# Patient Record
Sex: MALE | Race: WHITE | NOT HISPANIC OR LATINO | Employment: OTHER | URBAN - METROPOLITAN AREA
[De-identification: names, ages, dates, MRNs, and addresses within clinical notes are randomized per-mention and may not be internally consistent; named-entity substitution may affect disease eponyms.]

---

## 2018-01-25 ENCOUNTER — APPOINTMENT (OUTPATIENT)
Dept: LAB | Facility: CLINIC | Age: 83
End: 2018-01-25
Payer: MEDICARE

## 2018-01-25 ENCOUNTER — TRANSCRIBE ORDERS (OUTPATIENT)
Dept: LAB | Facility: CLINIC | Age: 83
End: 2018-01-25

## 2018-01-25 DIAGNOSIS — E03.9 MYXEDEMA HEART DISEASE: ICD-10-CM

## 2018-01-25 DIAGNOSIS — E55.9 VITAMIN D DEFICIENCY: ICD-10-CM

## 2018-01-25 DIAGNOSIS — I51.9 MYXEDEMA HEART DISEASE: ICD-10-CM

## 2018-01-25 DIAGNOSIS — N39.0 URINARY TRACT INFECTION WITHOUT HEMATURIA, SITE UNSPECIFIED: ICD-10-CM

## 2018-01-25 DIAGNOSIS — E11.69 TYPE 2 DIABETES MELLITUS WITH OTHER SPECIFIED COMPLICATION, WITHOUT LONG-TERM CURRENT USE OF INSULIN (HCC): Primary | ICD-10-CM

## 2018-01-25 DIAGNOSIS — R97.20 ELEVATED PROSTATE SPECIFIC ANTIGEN (PSA): ICD-10-CM

## 2018-01-25 DIAGNOSIS — D51.0 PERNICIOUS ANEMIA: ICD-10-CM

## 2018-01-25 DIAGNOSIS — R53.83 FATIGUE, UNSPECIFIED TYPE: ICD-10-CM

## 2018-01-25 DIAGNOSIS — E78.5 HYPERLIPIDEMIA, UNSPECIFIED HYPERLIPIDEMIA TYPE: ICD-10-CM

## 2018-01-25 LAB
25(OH)D3 SERPL-MCNC: 33.7 NG/ML (ref 30–100)
ALBUMIN SERPL BCP-MCNC: 3.7 G/DL (ref 3.5–5)
ALP SERPL-CCNC: 57 U/L (ref 46–116)
ALT SERPL W P-5'-P-CCNC: 22 U/L (ref 12–78)
ANION GAP SERPL CALCULATED.3IONS-SCNC: 7 MMOL/L (ref 4–13)
AST SERPL W P-5'-P-CCNC: 13 U/L (ref 5–45)
BACTERIA UR QL AUTO: NORMAL /HPF
BILIRUB SERPL-MCNC: 0.63 MG/DL (ref 0.2–1)
BILIRUB UR QL STRIP: NEGATIVE
BUN SERPL-MCNC: 37 MG/DL (ref 5–25)
CALCIUM SERPL-MCNC: 9.2 MG/DL (ref 8.3–10.1)
CHLORIDE SERPL-SCNC: 103 MMOL/L (ref 100–108)
CHOLEST SERPL-MCNC: 181 MG/DL (ref 50–200)
CLARITY UR: CLEAR
CO2 SERPL-SCNC: 27 MMOL/L (ref 21–32)
COLOR UR: YELLOW
CREAT SERPL-MCNC: 1.57 MG/DL (ref 0.6–1.3)
CREAT UR-MCNC: 74.7 MG/DL
ERYTHROCYTE [DISTWIDTH] IN BLOOD BY AUTOMATED COUNT: 14.9 % (ref 11.6–15.1)
EST. AVERAGE GLUCOSE BLD GHB EST-MCNC: 163 MG/DL
GFR SERPL CREATININE-BSD FRML MDRD: 38 ML/MIN/1.73SQ M
GLUCOSE P FAST SERPL-MCNC: 131 MG/DL (ref 65–99)
GLUCOSE UR STRIP-MCNC: ABNORMAL MG/DL
HBA1C MFR BLD: 7.3 % (ref 4.2–6.3)
HCT VFR BLD AUTO: 37.4 % (ref 36.5–49.3)
HDLC SERPL-MCNC: 67 MG/DL (ref 40–60)
HGB BLD-MCNC: 12.7 G/DL (ref 12–17)
HGB UR QL STRIP.AUTO: NEGATIVE
HYALINE CASTS #/AREA URNS LPF: NORMAL /LPF
KETONES UR STRIP-MCNC: NEGATIVE MG/DL
LDLC SERPL CALC-MCNC: 72 MG/DL (ref 0–100)
LEUKOCYTE ESTERASE UR QL STRIP: ABNORMAL
MCH RBC QN AUTO: 32.5 PG (ref 26.8–34.3)
MCHC RBC AUTO-ENTMCNC: 34 G/DL (ref 31.4–37.4)
MCV RBC AUTO: 96 FL (ref 82–98)
MICROALBUMIN UR-MCNC: <5 MG/L (ref 0–20)
MICROALBUMIN/CREAT 24H UR: <7 MG/G CREATININE (ref 0–30)
NITRITE UR QL STRIP: NEGATIVE
NON-SQ EPI CELLS URNS QL MICRO: NORMAL /HPF
PH UR STRIP.AUTO: 6 [PH] (ref 4.5–8)
PLATELET # BLD AUTO: 179 THOUSANDS/UL (ref 149–390)
PMV BLD AUTO: 10.1 FL (ref 8.9–12.7)
POTASSIUM SERPL-SCNC: 3.9 MMOL/L (ref 3.5–5.3)
PROT SERPL-MCNC: 7.1 G/DL (ref 6.4–8.2)
PROT UR STRIP-MCNC: NEGATIVE MG/DL
PSA SERPL-MCNC: 3 NG/ML (ref 0–4)
RBC # BLD AUTO: 3.91 MILLION/UL (ref 3.88–5.62)
RBC #/AREA URNS AUTO: NORMAL /HPF
SODIUM SERPL-SCNC: 137 MMOL/L (ref 136–145)
SP GR UR STRIP.AUTO: 1.02 (ref 1–1.03)
T4 SERPL-MCNC: 9.6 UG/DL (ref 4.7–13.3)
TRIGL SERPL-MCNC: 208 MG/DL
TSH SERPL DL<=0.05 MIU/L-ACNC: 4.46 UIU/ML (ref 0.36–3.74)
UROBILINOGEN UR QL STRIP.AUTO: 0.2 E.U./DL
VIT B12 SERPL-MCNC: 252 PG/ML (ref 100–900)
WBC # BLD AUTO: 4.94 THOUSAND/UL (ref 4.31–10.16)
WBC #/AREA URNS AUTO: NORMAL /HPF

## 2018-01-25 PROCEDURE — 83036 HEMOGLOBIN GLYCOSYLATED A1C: CPT

## 2018-01-25 PROCEDURE — 84436 ASSAY OF TOTAL THYROXINE: CPT

## 2018-01-25 PROCEDURE — 80061 LIPID PANEL: CPT

## 2018-01-25 PROCEDURE — 36415 COLL VENOUS BLD VENIPUNCTURE: CPT

## 2018-01-25 PROCEDURE — 82570 ASSAY OF URINE CREATININE: CPT

## 2018-01-25 PROCEDURE — 82607 VITAMIN B-12: CPT

## 2018-01-25 PROCEDURE — 84443 ASSAY THYROID STIM HORMONE: CPT

## 2018-01-25 PROCEDURE — 84153 ASSAY OF PSA TOTAL: CPT

## 2018-01-25 PROCEDURE — 80053 COMPREHEN METABOLIC PANEL: CPT

## 2018-01-25 PROCEDURE — 82043 UR ALBUMIN QUANTITATIVE: CPT

## 2018-01-25 PROCEDURE — 81001 URINALYSIS AUTO W/SCOPE: CPT

## 2018-01-25 PROCEDURE — 82306 VITAMIN D 25 HYDROXY: CPT

## 2018-01-25 PROCEDURE — 85027 COMPLETE CBC AUTOMATED: CPT

## 2018-10-09 ENCOUNTER — APPOINTMENT (OUTPATIENT)
Dept: LAB | Facility: CLINIC | Age: 83
End: 2018-10-09
Payer: MEDICARE

## 2018-10-09 ENCOUNTER — TRANSCRIBE ORDERS (OUTPATIENT)
Dept: LAB | Facility: CLINIC | Age: 83
End: 2018-10-09

## 2018-10-09 DIAGNOSIS — I10 HYPERTENSION, UNSPECIFIED TYPE: Primary | ICD-10-CM

## 2018-10-09 DIAGNOSIS — E13.8 DIABETES MELLITUS OF OTHER TYPE WITH COMPLICATION, UNSPECIFIED WHETHER LONG TERM INSULIN USE: ICD-10-CM

## 2018-10-09 DIAGNOSIS — E78.00 PURE HYPERCHOLESTEROLEMIA: ICD-10-CM

## 2018-10-09 DIAGNOSIS — I10 HYPERTENSION, UNSPECIFIED TYPE: ICD-10-CM

## 2018-10-09 LAB
25(OH)D3 SERPL-MCNC: 53.3 NG/ML (ref 30–100)
ALBUMIN SERPL BCP-MCNC: 3.6 G/DL (ref 3.5–5)
ALP SERPL-CCNC: 54 U/L (ref 46–116)
ALT SERPL W P-5'-P-CCNC: 20 U/L (ref 12–78)
ANION GAP SERPL CALCULATED.3IONS-SCNC: 8 MMOL/L (ref 4–13)
AST SERPL W P-5'-P-CCNC: 16 U/L (ref 5–45)
BACTERIA UR QL AUTO: ABNORMAL /HPF
BASOPHILS # BLD AUTO: 0.02 THOUSANDS/ΜL (ref 0–0.1)
BASOPHILS NFR BLD AUTO: 0 % (ref 0–1)
BILIRUB SERPL-MCNC: 0.52 MG/DL (ref 0.2–1)
BILIRUB UR QL STRIP: NEGATIVE
BUN SERPL-MCNC: 33 MG/DL (ref 5–25)
CALCIUM SERPL-MCNC: 9.6 MG/DL (ref 8.3–10.1)
CHLORIDE SERPL-SCNC: 104 MMOL/L (ref 100–108)
CHOLEST SERPL-MCNC: 155 MG/DL (ref 50–200)
CLARITY UR: CLEAR
CO2 SERPL-SCNC: 26 MMOL/L (ref 21–32)
COLOR UR: YELLOW
CREAT SERPL-MCNC: 1.59 MG/DL (ref 0.6–1.3)
EOSINOPHIL # BLD AUTO: 0.05 THOUSAND/ΜL (ref 0–0.61)
EOSINOPHIL NFR BLD AUTO: 1 % (ref 0–6)
ERYTHROCYTE [DISTWIDTH] IN BLOOD BY AUTOMATED COUNT: 14.6 % (ref 11.6–15.1)
FOLATE SERPL-MCNC: 19.3 NG/ML (ref 3.1–17.5)
GFR SERPL CREATININE-BSD FRML MDRD: 37 ML/MIN/1.73SQ M
GLUCOSE P FAST SERPL-MCNC: 97 MG/DL (ref 65–99)
GLUCOSE UR STRIP-MCNC: ABNORMAL MG/DL
HCT VFR BLD AUTO: 33.9 % (ref 36.5–49.3)
HDLC SERPL-MCNC: 55 MG/DL (ref 40–60)
HGB BLD-MCNC: 11.2 G/DL (ref 12–17)
HGB UR QL STRIP.AUTO: NEGATIVE
HYALINE CASTS #/AREA URNS LPF: ABNORMAL /LPF
IMM GRANULOCYTES # BLD AUTO: 0.01 THOUSAND/UL (ref 0–0.2)
IMM GRANULOCYTES NFR BLD AUTO: 0 % (ref 0–2)
KETONES UR STRIP-MCNC: NEGATIVE MG/DL
LDLC SERPL CALC-MCNC: 65 MG/DL (ref 0–100)
LEUKOCYTE ESTERASE UR QL STRIP: ABNORMAL
LYMPHOCYTES # BLD AUTO: 1.39 THOUSANDS/ΜL (ref 0.6–4.47)
LYMPHOCYTES NFR BLD AUTO: 29 % (ref 14–44)
MAGNESIUM SERPL-MCNC: 1.8 MG/DL (ref 1.6–2.6)
MCH RBC QN AUTO: 32.2 PG (ref 26.8–34.3)
MCHC RBC AUTO-ENTMCNC: 33 G/DL (ref 31.4–37.4)
MCV RBC AUTO: 97 FL (ref 82–98)
MONOCYTES # BLD AUTO: 0.52 THOUSAND/ΜL (ref 0.17–1.22)
MONOCYTES NFR BLD AUTO: 11 % (ref 4–12)
NEUTROPHILS # BLD AUTO: 2.76 THOUSANDS/ΜL (ref 1.85–7.62)
NEUTS SEG NFR BLD AUTO: 59 % (ref 43–75)
NITRITE UR QL STRIP: NEGATIVE
NON-SQ EPI CELLS URNS QL MICRO: ABNORMAL /HPF
NONHDLC SERPL-MCNC: 100 MG/DL
NRBC BLD AUTO-RTO: 0 /100 WBCS
PH UR STRIP.AUTO: 6 [PH] (ref 4.5–8)
PLATELET # BLD AUTO: 154 THOUSANDS/UL (ref 149–390)
PMV BLD AUTO: 10.6 FL (ref 8.9–12.7)
POTASSIUM SERPL-SCNC: 3.9 MMOL/L (ref 3.5–5.3)
PROT SERPL-MCNC: 6.7 G/DL (ref 6.4–8.2)
PROT UR STRIP-MCNC: NEGATIVE MG/DL
PSA SERPL-MCNC: 3.2 NG/ML (ref 0–4)
RBC # BLD AUTO: 3.48 MILLION/UL (ref 3.88–5.62)
RBC #/AREA URNS AUTO: ABNORMAL /HPF
SODIUM SERPL-SCNC: 138 MMOL/L (ref 136–145)
SP GR UR STRIP.AUTO: 1.02 (ref 1–1.03)
T4 FREE SERPL-MCNC: 0.98 NG/DL (ref 0.76–1.46)
TRIGL SERPL-MCNC: 175 MG/DL
TSH SERPL DL<=0.05 MIU/L-ACNC: 2.68 UIU/ML (ref 0.36–3.74)
UROBILINOGEN UR QL STRIP.AUTO: 0.2 E.U./DL
VIT B12 SERPL-MCNC: 223 PG/ML (ref 100–900)
WBC # BLD AUTO: 4.75 THOUSAND/UL (ref 4.31–10.16)
WBC #/AREA URNS AUTO: ABNORMAL /HPF

## 2018-10-09 PROCEDURE — 82746 ASSAY OF FOLIC ACID SERUM: CPT

## 2018-10-09 PROCEDURE — 84443 ASSAY THYROID STIM HORMONE: CPT

## 2018-10-09 PROCEDURE — 82607 VITAMIN B-12: CPT

## 2018-10-09 PROCEDURE — 83735 ASSAY OF MAGNESIUM: CPT

## 2018-10-09 PROCEDURE — 82306 VITAMIN D 25 HYDROXY: CPT

## 2018-10-09 PROCEDURE — 81001 URINALYSIS AUTO W/SCOPE: CPT

## 2018-10-09 PROCEDURE — 86618 LYME DISEASE ANTIBODY: CPT

## 2018-10-09 PROCEDURE — 85025 COMPLETE CBC W/AUTO DIFF WBC: CPT

## 2018-10-09 PROCEDURE — 84207 ASSAY OF VITAMIN B-6: CPT

## 2018-10-09 PROCEDURE — G0103 PSA SCREENING: HCPCS

## 2018-10-09 PROCEDURE — 36415 COLL VENOUS BLD VENIPUNCTURE: CPT

## 2018-10-09 PROCEDURE — 84439 ASSAY OF FREE THYROXINE: CPT

## 2018-10-09 PROCEDURE — 80053 COMPREHEN METABOLIC PANEL: CPT

## 2018-10-09 PROCEDURE — 80061 LIPID PANEL: CPT

## 2018-10-09 PROCEDURE — 84425 ASSAY OF VITAMIN B-1: CPT

## 2018-10-11 LAB
B BURGDOR IGG SER IA-ACNC: 0.23
B BURGDOR IGM SER IA-ACNC: 0.12

## 2018-10-12 LAB — VIT B6 SERPL-MCNC: 7.3 UG/L (ref 5.3–46.7)

## 2018-10-13 LAB — VIT B1 BLD-SCNC: 68.6 NMOL/L (ref 66.5–200)

## 2019-01-14 ENCOUNTER — APPOINTMENT (EMERGENCY)
Dept: CT IMAGING | Facility: HOSPITAL | Age: 84
End: 2019-01-14
Payer: MEDICARE

## 2019-01-14 ENCOUNTER — HOSPITAL ENCOUNTER (EMERGENCY)
Facility: HOSPITAL | Age: 84
Discharge: HOME | End: 2019-01-14
Attending: EMERGENCY MEDICINE | Admitting: EMERGENCY MEDICINE
Payer: MEDICARE

## 2019-01-14 VITALS
OXYGEN SATURATION: 100 % | WEIGHT: 134.44 LBS | HEIGHT: 66 IN | TEMPERATURE: 97.8 F | RESPIRATION RATE: 16 BRPM | SYSTOLIC BLOOD PRESSURE: 127 MMHG | HEART RATE: 81 BPM | DIASTOLIC BLOOD PRESSURE: 64 MMHG | BODY MASS INDEX: 21.61 KG/M2
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
## 2019-01-14 DIAGNOSIS — S09.90XA MINOR HEAD INJURY: ICD-10-CM

## 2019-01-14 DIAGNOSIS — S01.01XA SCALP LACERATION: Primary | ICD-10-CM

## 2019-01-14 DIAGNOSIS — S51.012A SKIN TEAR OF LEFT ELBOW WITHOUT COMPLICATION: ICD-10-CM

## 2019-01-14 LAB
ANION GAP SERPL CALCULATED.3IONS-SCNC: 9 MMOL/L (ref 4–13)
BASOPHILS # BLD AUTO: 0.02 THOUSANDS/ΜL (ref 0–0.1)
BASOPHILS NFR BLD AUTO: 0 % (ref 0–1)
BUN SERPL-MCNC: 36 MG/DL (ref 5–25)
CALCIUM SERPL-MCNC: 9.4 MG/DL (ref 8.3–10.1)
CHLORIDE SERPL-SCNC: 102 MMOL/L (ref 100–108)
CLARITY, POC: CLEAR
CO2 SERPL-SCNC: 28 MMOL/L (ref 21–32)
COLOR, POC: YELLOW
CREAT SERPL-MCNC: 1.59 MG/DL (ref 0.6–1.3)
EOSINOPHIL # BLD AUTO: 0.05 THOUSAND/ΜL (ref 0–0.61)
EOSINOPHIL NFR BLD AUTO: 1 % (ref 0–6)
ERYTHROCYTE [DISTWIDTH] IN BLOOD BY AUTOMATED COUNT: 14.3 % (ref 11.6–15.1)
EXT BILIRUBIN, UA: NORMAL
EXT BLOOD URINE: NORMAL
EXT GLUCOSE, UA: NORMAL
EXT KETONES: NORMAL
EXT NITRITE, UA: NORMAL
EXT PH, UA: 5
EXT PROTEIN, UA: NORMAL
EXT SPECIFIC GRAVITY, UA: 1.01
EXT UROBILINOGEN: 0.2
GFR SERPL CREATININE-BSD FRML MDRD: 37 ML/MIN/1.73SQ M
GLUCOSE SERPL-MCNC: 189 MG/DL (ref 65–140)
HCT VFR BLD AUTO: 38.7 % (ref 36.5–49.3)
HGB BLD-MCNC: 12.5 G/DL (ref 12–17)
IMM GRANULOCYTES # BLD AUTO: 0.03 THOUSAND/UL (ref 0–0.2)
IMM GRANULOCYTES NFR BLD AUTO: 1 % (ref 0–2)
LYMPHOCYTES # BLD AUTO: 1.06 THOUSANDS/ΜL (ref 0.6–4.47)
LYMPHOCYTES NFR BLD AUTO: 17 % (ref 14–44)
MCH RBC QN AUTO: 31.9 PG (ref 26.8–34.3)
MCHC RBC AUTO-ENTMCNC: 32.3 G/DL (ref 31.4–37.4)
MCV RBC AUTO: 99 FL (ref 82–98)
MONOCYTES # BLD AUTO: 0.55 THOUSAND/ΜL (ref 0.17–1.22)
MONOCYTES NFR BLD AUTO: 9 % (ref 4–12)
NEUTROPHILS # BLD AUTO: 4.5 THOUSANDS/ΜL (ref 1.85–7.62)
NEUTS SEG NFR BLD AUTO: 72 % (ref 43–75)
NRBC BLD AUTO-RTO: 0 /100 WBCS
PLATELET # BLD AUTO: 181 THOUSANDS/UL (ref 149–390)
PMV BLD AUTO: 9.7 FL (ref 8.9–12.7)
POTASSIUM SERPL-SCNC: 4.3 MMOL/L (ref 3.5–5.3)
RBC # BLD AUTO: 3.92 MILLION/UL (ref 3.88–5.62)
SODIUM SERPL-SCNC: 139 MMOL/L (ref 136–145)
TROPONIN I SERPL-MCNC: <0.02 NG/ML
WBC # BLD AUTO: 6.21 THOUSAND/UL (ref 4.31–10.16)
WBC # BLD EST: NORMAL 10*3/UL

## 2019-01-14 PROCEDURE — 84484 ASSAY OF TROPONIN QUANT: CPT | Performed by: EMERGENCY MEDICINE

## 2019-01-14 PROCEDURE — 81002 URINALYSIS NONAUTO W/O SCOPE: CPT | Performed by: EMERGENCY MEDICINE

## 2019-01-14 PROCEDURE — 80048 BASIC METABOLIC PNL TOTAL CA: CPT | Performed by: EMERGENCY MEDICINE

## 2019-01-14 PROCEDURE — 70450 CT HEAD/BRAIN W/O DYE: CPT

## 2019-01-14 PROCEDURE — 90715 TDAP VACCINE 7 YRS/> IM: CPT | Performed by: EMERGENCY MEDICINE

## 2019-01-14 PROCEDURE — 85025 COMPLETE CBC W/AUTO DIFF WBC: CPT | Performed by: EMERGENCY MEDICINE

## 2019-01-14 PROCEDURE — 90471 IMMUNIZATION ADMIN: CPT

## 2019-01-14 PROCEDURE — 93005 ELECTROCARDIOGRAM TRACING: CPT

## 2019-01-14 PROCEDURE — 72125 CT NECK SPINE W/O DYE: CPT

## 2019-01-14 PROCEDURE — 99284 EMERGENCY DEPT VISIT MOD MDM: CPT

## 2019-01-14 PROCEDURE — 36415 COLL VENOUS BLD VENIPUNCTURE: CPT | Performed by: EMERGENCY MEDICINE

## 2019-01-14 RX ORDER — LIDOCAINE HYDROCHLORIDE AND EPINEPHRINE 10; 10 MG/ML; UG/ML
20 INJECTION, SOLUTION INFILTRATION; PERINEURAL ONCE
Status: COMPLETED | OUTPATIENT
Start: 2019-01-14 | End: 2019-01-14

## 2019-01-14 RX ORDER — GINSENG 100 MG
1 CAPSULE ORAL ONCE
Status: COMPLETED | OUTPATIENT
Start: 2019-01-14 | End: 2019-01-14

## 2019-01-14 RX ORDER — ATORVASTATIN CALCIUM 10 MG/1
10 TABLET, FILM COATED ORAL DAILY
COMMUNITY

## 2019-01-14 RX ADMIN — LIDOCAINE HYDROCHLORIDE,EPINEPHRINE BITARTRATE 20 ML: 10; .01 INJECTION, SOLUTION INFILTRATION; PERINEURAL at 15:15

## 2019-01-14 RX ADMIN — BACITRACIN 1 SMALL APPLICATION: 500 OINTMENT TOPICAL at 15:40

## 2019-01-14 RX ADMIN — TETANUS TOXOID, REDUCED DIPHTHERIA TOXOID AND ACELLULAR PERTUSSIS VACCINE, ADSORBED 0.5 ML: 5; 2.5; 8; 8; 2.5 SUSPENSION INTRAMUSCULAR at 15:51

## 2019-01-14 NOTE — TRAUMA DOCUMENTATION
Pt cleansed of dried blood to face, neck, hands  Left elbow superficial skin tear cleansed, non-adherent dressing applied

## 2019-01-14 NOTE — DISCHARGE INSTRUCTIONS
Head Injury   WHAT YOU NEED TO KNOW:   A head injury is most often caused by a blow to the head  This may occur from a fall, bicycle injury, sports injury, being struck in the head, or a motor vehicle accident  DISCHARGE INSTRUCTIONS:   Call 911 or have someone else call for any of the following:   · You cannot be woken  · You have a seizure  · You stop responding to others or you faint  · You have blurry or double vision  · Your speech becomes slurred or confused  · You have arm or leg weakness, loss of feeling, or new problems with coordination  · Your pupils are larger than usual or one pupil is a different size than the other  · You have blood or clear fluid coming out of your ears or nose  Return to the emergency department if:   · You have repeated or forceful vomiting  · You feel confused  · Your headache gets worse or becomes severe  · You or someone caring for you notices that you are harder to wake than usual   Contact your healthcare provider if:   · Your symptoms last longer than 6 weeks after the injury  · You have questions or concerns about your condition or care  Medicines:   · Acetaminophen  decreases pain  Acetaminophen is available without a doctor's order  Ask how much to take and how often to take it  Follow directions  Acetaminophen can cause liver damage if not taken correctly  · Take your medicine as directed  Contact your healthcare provider if you think your medicine is not helping or if you have side effects  Tell him or her if you are allergic to any medicine  Keep a list of the medicines, vitamins, and herbs you take  Include the amounts, and when and why you take them  Bring the list or the pill bottles to follow-up visits  Carry your medicine list with you in case of an emergency  Self-care:   · Rest  or do quiet activities for 24 to 48 hours  Limit your time watching TV, using the computer, or doing tasks that require a lot of thinking  Slowly return to your normal activities as directed  Do not play sports or do activities that may cause you to get hit in the head  Ask your healthcare provider when you can return to sports  · Apply ice  on your head for 15 to 20 minutes every hour or as directed  Use an ice pack, or put crushed ice in a plastic bag  Cover it with a towel before you apply it to your skin  Ice helps prevent tissue damage and decreases swelling and pain  · Have someone stay with you for 24 hours  or as directed  This person can monitor you for complications and call [phone]  When you are awake the person should ask you a few questions to see if you are thinking clearly  An example would be to ask your name or your address  Prevent another head injury:   · Wear a helmet that fits properly  Do this when you play sports, or ride a bike, scooter, or skateboard  Helmets help decrease your risk of a serious head injury  Talk to your healthcare provider about other ways you can protect yourself if you play sports  · Wear your seat belt every time you are in a car  This helps to decrease your risk for a head injury if you are in a car accident  Follow up with your healthcare provider as directed:  Write down your questions so you remember to ask them during your visits  © 2017 [facility] Information is for End User's use only and may not be sold, redistributed or otherwise used for commercial purposes  All illustrations and images included in CareNotes® are the copyrighted property of [employer]  or [employer]harat Webster  The above information is an  only  It is not intended as medical advice for individual conditions or treatments  Talk to your doctor, nurse or pharmacist before following any medical regimen to see if it is safe and effective for you  Laceration   WHAT YOU NEED TO KNOW:   A laceration is an injury to the skin and the soft tissue underneath it   Lacerations happen when you are cut or hit by something  They can happen anywhere on the body  DISCHARGE INSTRUCTIONS:   Return to the emergency department if:   · You have heavy bleeding or bleeding that does not stop after 10 minutes of holding firm, direct pressure over the wound  · Your wound opens up  Contact your healthcare provider if:   · You have a fever or chills  · Your laceration is red, warm, or swollen  · You have red streaks on your skin coming from your wound  · You have white or yellow drainage from the wound that smells bad  · You have pain that gets worse, even after treatment  · You have questions or concerns about your condition or care  Medicines:   · Prescription pain medicine  may be given  Ask how to take this medicine safely  · Antibiotics  help treat or prevent a bacterial infection  · Take your medicine as directed  Contact your healthcare provider if you think your medicine is not helping or if you have side effects  Tell him or her if you are allergic to any medicine  Keep a list of the medicines, vitamins, and herbs you take  Include the amounts, and when and why you take them  Bring the list or the pill bottles to follow-up visits  Carry your medicine list with you in case of an emergency  Care for your wound as directed:   · Do not get your wound wet  until your healthcare provider says it is okay  Do not soak your wound in water  Do not go swimming until your healthcare provider says it is okay  Carefully wash the wound with soap and water  Gently pat the area dry or allow it to air dry  · Change your bandages  when they get wet, dirty, or after washing  Apply new, clean bandages as directed  Do not apply elastic bandages or tape too tight  Do not put powders or lotions over your incision  · Apply antibiotic ointment as directed  Your healthcare provider may give you antibiotic ointment to put over your wound if you have stitches   If you have strips of tape over your incision, let them dry up and fall off on their own  If they do not fall off within 14 days, gently remove them  If you have glue over your wound, do not remove or pick at it  If your glue comes off, do not replace it with glue that you have at home  · Check your wound every day for signs of infection such as swelling, redness, or pus  Self-care:   · Apply ice  on your wound for 15 to 20 minutes every hour or as directed  Use an ice pack, or put crushed ice in a plastic bag  Cover it with a towel  Ice helps prevent tissue damage and decreases swelling and pain  · Use a splint as directed  A splint will decrease movement and stress on your wound  It may help it heal faster  A splint may be used for lacerations over joints or areas of your body that bend  Ask your healthcare provider how to apply and remove a splint  · Decrease scarring of your wound  by applying ointments as directed  Do not apply ointments until your healthcare provider says it is okay  You may need to wait until your wound is healed  Ask which ointment to buy and how often to use it  After your wound is healed, use sunscreen over the area when you are out in the sun  You should do this for at least 6 months to 1 year after your injury  Follow up with your healthcare provider as directed: You may need to follow up in 24 to 48 hours to have your wound checked for infection  You will need to return in 3 to 14 days if you have stitches or staples so they can be removed  Care for your wound as directed to prevent infection and help it heal  Write down your questions so you remember to ask them during your visits  © 2017 [address] Information is for End User's use only and may not be sold, redistributed or otherwise used for commercial purposes  All illustrations and images included in CareNotes® are the copyrighted property of [employer]  or Bharat Webster  The above information is an  only  It is not intended as medical advice for individual conditions or treatments  Talk to your doctor, nurse or pharmacist before following any medical regimen to see if it is safe and effective for you  Skin Tear   WHAT YOU NEED TO KNOW:   A skin tear occurs when the layers of weakened skin split open from an injury  [place], elderly, and chronically or critically ill people are at higher risk for skin tears  Long-term use of steroids can also increase the risk  It is important to treat and prevent skin tears to prevent infection  DISCHARGE INSTRUCTIONS:   Medicines:   · Medicines  may be given to reduce your pain or to treat or prevent an infection  Do not wait until the pain is severe before you ask for more medicine  · Take your medicine as directed  Contact your healthcare provider if you think your medicine is not helping or if you have side effects  Tell him of her if you are allergic to any medicine  Keep a list of the medicines, vitamins, and herbs you take  Include the amounts, and when and why you take them  Bring the list or the pill bottles to follow-up visits  Carry your medicine list with you in case of an emergency  Follow up with your healthcare provider as directed:  Write down your questions so you remember to ask them during your visits  Wound care: You may be referred to a wound specialist who will teach you how to clean your wound properly  Ask for more information about wound care  Prevent another skin tear:   · Clean, moisturize, and protect your skin  Baths, hot showers, and soap can dry your skin and increase your risk for skin tears  Take tepid showers, use mild soap as directed, and gently pat your skin dry  Use lotion to keep your skin moist after you shower  Wear long sleeves, pants, and protective footwear  · Move carefully  Ask for help if you cannot lift yourself well enough to avoid dragging your skin when you move  · Keep your home safe    Cover sharp corners, keep your pathways clear, and turn on lights so you can see clearly  Ask for more information if you have questions about home safety  · Drink liquids as directed  Ask your healthcare provider how much liquid to drink each day and which liquids are best for you  Liquids will help keep your skin moist and protected from future skin tears  · Eat high-protein foods  Examples are lean meats, fish, low-fat dairy products, and beans  A dietitian may help you create high-protein meal plans to help with wound healing  Contact your healthcare provider if:   · You have redness, swelling, pus, or a bad odor coming from your wound  · Blood soaks through your bandage  · You have increased pain, even after you take pain medicine  · Your wound tears open again  Return to the emergency department if:   · You have a fever  © 2017 [address] Information is for End User's use only and may not be sold, redistributed or otherwise used for commercial purposes  All illustrations and images included in CareNotes® are the copyrighted property of [employer]  or [employer]harat Webster  The above information is an  only  It is not intended as medical advice for individual conditions or treatments  Talk to your doctor, nurse or pharmacist before following any medical regimen to see if it is safe and effective for you

## 2019-01-14 NOTE — ED PROVIDER NOTES
H&P Exam - Trauma   Ragini Lloyd 80 y o  male MRN: [id number]  Unit/Bed#: ED 08/ED 08 Encounter: [id number]    Assessment/Plan   Trauma Alert: Trauma Acuity: Trauma Evaluation  Model of Arrival: Trauma Mode of Arrival: BLS via    Trauma Team: Current Providers  Attending Provider: Daysi Campuzano DO  Registered Nurse: Stormy Sam RN  ED Technician: Jelena Gordon  ED Technician: Milagros Chowdhury  Consultants: None    Trauma Active Problems: fall    Trauma Plan: cth, labs, ekg    Chief Complaint:   Chief Complaint   Patient presents with    Fall     Patient presents to the ER via ambulance from an assisted living facility with report that the patient lost his balance fell backward and hit his head on a sharp radiator  History of Present Illness   HPI:  Nicolas Francis is a 80 y o  male who presents with fall  Mechanism:           Got lightheaded in bathroom and fell backward and hit head on floor  Has laceration to back of scalp  No loc  Denies neck or back pain  C/o mild ha  No cp/palp, no n/v, no numbness or weakness  Denies any changes in meds   No recent illness,         History provided by:  Patient, relative and EMS personnel   used: No    Fall   Mechanism of injury: fall    Injury location:  Head/neck  Head/neck injury location:  Scalp  Incident location:  Home  Arrived directly from scene: yes    Fall:     Fall occurred:  Standing and in the bathroom    Impact surface:  Hard floor    Point of impact:  Head    Entrapped after fall: no    Protective equipment: none    Suspicion of alcohol use: no    Suspicion of drug use: no    Tetanus status:  Unknown  Prior to arrival data:     Bystander interventions:  None    Patient ambulatory at scene: yes      Blood loss:  Minimal    Responsiveness at scene:  Alert    Orientation at scene:  Person, place, situation and time    Loss of consciousness: no      Amnesic to event: no      Immobilization:  None  Associated symptoms: headaches    Associated symptoms: no back pain, no chest pain, no difficulty breathing, no loss of consciousness, no nausea, no neck pain and no vomiting    Risk factors: no anticoagulation therapy      Review of Systems   Cardiovascular: Negative for chest pain  Gastrointestinal: Negative for nausea and vomiting  Musculoskeletal: Negative for back pain and neck pain  Neurological: Positive for headaches  Negative for loss of consciousness  All other systems reviewed and are negative  Historical Information     Immunizations:   Immunization History   Administered Date(s) Administered    Tdap 01/14/2019       Past Medical History:   Diagnosis Date    Diabetes mellitus ([facility] [address] )     Hypertension      History reviewed  No pertinent family history  Past Surgical History:   Procedure Laterality Date    HERNIA REPAIR         Social History     Social History    Marital status: Single     Spouse name: N/A    Number of children: N/A    Years of education: N/A     Social History Main Topics    Smoking status: Never Smoker    Smokeless tobacco: Never Used    Alcohol use Yes      Comment: manhattens for "big occasions"     Drug use: No    Sexual activity: No     Other Topics Concern    None     Social History Narrative    None       Family History: non-contributory    Meds/Allergies   Prior to Admission Medications   Prescriptions Last Dose Informant Patient Reported? Taking?    Cholecalciferol (VITAMIN D) 2000 UNITS CAPS   Yes No   Sig: Take 2,000 Units by mouth daily with breakfast   atorvastatin (LIPITOR) 10 mg tablet   Yes Yes   Sig: Take 10 mg by mouth daily   canagliflozin (INVOKANA) 100 mg   Yes No   Sig: Take 300 mg by mouth daily before breakfast     finasteride (PROSCAR) 5 mg tablet   Yes No   Sig: Take 5 mg by mouth daily   furosemide (LASIX) 20 mg tablet   No No   Sig: Take 1 tablet by mouth daily for 30 days   Patient taking differently: Take 20 mg by mouth every other day     glipiZIDE (GLUCOTROL) 10 mg tablet   Yes No   Sig: Take 10 mg by mouth 2 (two) times a day before meals   metFORMIN (GLUCOPHAGE) 500 mg tablet   Yes No   Sig: Take 1,000 mg by mouth 2 (two) times a day with meals     metFORMIN (GLUCOPHAGE) 500 mg tablet   Yes Yes   Sig: Take 500 mg by mouth daily with lunch   quinapril (ACCUPRIL) 20 mg tablet   Yes No   Sig: Take 20 mg by mouth 2 (two) times a day   repaglinide (PRANDIN) 2 mg tablet   Yes No   Sig: Take 2 mg by mouth 3 (three) times a day before meals     tamsulosin (FLOMAX) 0 4 mg   Yes No   Sig: Take 0 4 mg by mouth 2 (two) times a day        Facility-Administered Medications: None       No Known Allergies    PHYSICAL EXAM    PE limited by: n/a    Objective   Vitals:   First set: Temperature: (!) 97 1 °F (36 2 °C) (01/14/19 1325)  Pulse: 83 (01/14/19 1325)  Respirations: 18 (01/14/19 1325)  Blood Pressure: 133/61 (01/14/19 1325)  SpO2: 98 % (01/14/19 1325)    Primary Survey:   (A) Airway: intact  (B) Breathing: clear  (C) Circulation: Pulses:   normal  (D) Disabliity:  GCS Total:  15  (E) Expose:  Completed    Secondary Survey: (Click on Physical Exam tab above)  Physical Exam   Constitutional: He is oriented to person, place, and time  He appears well-developed and well-nourished  HENT:   Head: Normocephalic  Right Ear: External ear normal    Left Ear: External ear normal    Nose: Nose normal    Mouth/Throat: Oropharynx is clear and moist    Eyes: Pupils are equal, round, and reactive to light  Conjunctivae are normal    Neck: No spinous process tenderness and no muscular tenderness present  Cardiovascular: Normal rate and regular rhythm  Pulmonary/Chest: Effort normal and breath sounds normal  He exhibits no tenderness  Abdominal: Soft  Bowel sounds are normal    Musculoskeletal: He exhibits no edema or deformity  Neurological: He is alert and oriented to person, place, and time  Skin: Skin is warm  Nursing note and vitals reviewed        Invasive Devices     Peripheral Intravenous Line            Peripheral IV 01/14/19 Right Antecubital less than 1 day    Peripheral IV 01/14/19 Right Antecubital less than 1 day                Lab Results:   Results Reviewed     Procedure Component Value Units Date/Time    Troponin I [[id number]]  (Normal) Collected:  01/14/19 0214    Lab Status:  Final result Specimen:  Blood from Line, Venous Updated:  01/14/19 1459     Troponin I <0 02 ng/mL     Basic metabolic panel [[id number]]  (Abnormal) Collected:  01/14/19 0214    Lab Status:  Final result Specimen:  Blood from Line, Venous Updated:  01/14/19 1449     Sodium 139 mmol/L      Potassium 4 3 mmol/L      Chloride 102 mmol/L      CO2 28 mmol/L      ANION GAP 9 mmol/L      BUN 36 (H) mg/dL      Creatinine 1 59 (H) mg/dL      Glucose 189 (H) mg/dL      Calcium 9 4 mg/dL      eGFR 37 ml/min/1 73sq m     Narrative:         National Kidney Disease Education Program recommendations are as follows:  GFR calculation is accurate only with a steady state creatinine  Chronic Kidney disease less than 60 ml/min/1 73 sq  meters  Kidney failure less than 15 ml/min/1 73 sq  meters      POCT urinalysis dipstick [[id number]]  (Normal) Resulted:  01/14/19 1430    Lab Status:  Final result Specimen:  Urine Updated:  01/14/19 1439     Color, UA YELLOW     Clarity, UA CLEAR     Glucose, UA (Ref: Negative) 2 or more     Bilirubin, UA (Ref: Negative) NEG     Ketones, UA (Ref: Negative) NEG     Spec Grav, UA (Ref:1 003-1 030) 1 010     Blood, UA (Ref: Negative) TRACE     pH, UA (Ref: 4 5-8 0) 5 0     Protein, UA (Ref: Negative) NEG     Urobilinogen, UA (Ref: 0 2- 1 0) 0 2      Leukocytes, UA (Ref: Negative) NEG     Nitrite, UA (Ref: Negative) NEG    CBC and differential [[id number]]  (Abnormal) Collected:  01/14/19 0214    Lab Status:  Final result Specimen:  Blood from Line, Venous Updated:  01/14/19 1436     WBC 6 21 Thousand/uL      RBC 3 92 Million/uL      Hemoglobin 12 5 g/dL      Hematocrit 38 7 %      MCV 99 (H) fL      MCH 31 9 pg      MCHC 32 3 g/dL      RDW 14 3 %      MPV 9 7 fL      Platelets 749 Thousands/uL      nRBC 0 /100 WBCs      Neutrophils Relative 72 %      Immat GRANS % 1 %      Lymphocytes Relative 17 %      Monocytes Relative 9 %      Eosinophils Relative 1 %      Basophils Relative 0 %      Neutrophils Absolute 4 50 Thousands/µL      Immature Grans Absolute 0 03 Thousand/uL      Lymphocytes Absolute 1 06 Thousands/µL      Monocytes Absolute 0 55 Thousand/µL      Eosinophils Absolute 0 05 Thousand/µL      Basophils Absolute 0 02 Thousands/µL                  Imaging Studies:   TRAUMA - CT head wo contrast   ED Interpretation by Windy Garcia DO (01/14 1425)   See below      Final Result by Naa Muniz MD (01/14 4926)      No acute intracranial abnormality  Workstation performed: [id number]         TRAUMA - CT spine cervical wo contrast   ED Interpretation by Windy Garcia DO (01/14 1425)   See below      Final Result by Naa Muniz MD (01/14 7079)      No cervical spine fracture or traumatic malalignment  Advanced cervical degenerative changes  Workstation performed: [id number]             Other Studies: n/a    Code Status: Prior  Advance Directive and Living Will: Yes    Power of :    POLST:      Procedures  Lac Repair  Date/Time: 1/14/2019 2:45 PM  Performed by: Kirk Lauren  Authorized by: Kirk Lauren   Consent: Verbal consent obtained    Consent given by: patient  Patient identity confirmed: arm band    Anesthesia:  Local Anesthetic: lidocaine 1% with epinephrine  Anesthetic total: 10 mL    Sedation:  Patient sedated: no    Wound Dehiscence:  Superficial Wound Dehiscence: simple closure      Procedure Details:  Irrigation solution: saline  Amount of cleaning: standard  Debridement: none  Degree of undermining: none  Skin closure: 5-0 nylon (9)  Subcutaneous closure: 5-0 Vicryl (3)  Number of sutures: 9  Technique: simple  Approximation: close  Approximation difficulty: simple  Dressing: pressure dressing  Patient tolerance: Patient tolerated the procedure well with no immediate complications    ECG 12 Lead Documentation  Date/Time: 1/14/2019 2:35 PM  Performed by: Jeferson Millan  Authorized by: Jeferson Millan     ECG reviewed by me, the ED Provider: yes    Patient location:  ED  Previous ECG:     Previous ECG:  Compared to current           Phone Contacts  ED Phone Contact     ED Course         MDM  Number of Diagnoses or Management Options  Fall: new and requires workup  Minor head injury: new and requires workup  Scalp laceration: new and requires workup  Skin tear of left elbow without complication: new and requires workup     Amount and/or Complexity of Data Reviewed  Tests in the radiology section of CPT®: reviewed and ordered  Obtain history from someone other than the patient: yes  Independent visualization of images, tracings, or specimens: yes      CritCare Time    Disposition  Final diagnoses:   Scalp laceration   Skin tear of left elbow without complication   Minor head injury   Fall     Time reflects when diagnosis was documented in both MDM as applicable and the Disposition within this note     Time User Action Codes Description Comment    1/14/2019  3:30 PM Izabel Rosas Add [S01 01XA] Scalp laceration     1/14/2019  3:32 PM Izabel Rosas Add [S51 012A] Skin tear of left elbow without complication     4/84/6327  3:32 PM Izabel Rosas Add [S09 90XA] Minor head injury     1/14/2019  3:32 PM Ralph [address] [place] Add [W94  XXXA] Fall       ED Disposition     ED Disposition Condition Comment    Discharge  Kade Copier discharge to home/self care      Condition at discharge: Good        Follow-up Information     Follow up With Specialties Details Why Rosalia Baker MD [facility] Medicine Schedule an appointment as soon as possible for a visit 7-10 days for suture removal [address]  [phone]          Discharge Medication List as of 1/14/2019  3:36 PM      CONTINUE these medications which have NOT CHANGED    Details   atorvastatin (LIPITOR) 10 mg tablet Take 10 mg by mouth daily, Historical Med      !! metFORMIN (GLUCOPHAGE) 500 mg tablet Take 500 mg by mouth daily with lunch, Historical Med      canagliflozin (INVOKANA) 100 mg Take 300 mg by mouth daily before breakfast  , Historical Med      Cholecalciferol (VITAMIN D) 2000 UNITS CAPS Take 2,000 Units by mouth daily with breakfast, Until Discontinued, Historical Med      finasteride (PROSCAR) 5 mg tablet Take 5 mg by mouth daily, Until Discontinued, Historical Med      furosemide (LASIX) 20 mg tablet Take 1 tablet by mouth daily for 30 days, Starting 10/3/2016, Until Wed 11/2/16, Print      glipiZIDE (GLUCOTROL) 10 mg tablet Take 10 mg by mouth 2 (two) times a day before meals, Until Discontinued, Historical Med      !! metFORMIN (GLUCOPHAGE) 500 mg tablet Take 1,000 mg by mouth 2 (two) times a day with meals  , Historical Med      quinapril (ACCUPRIL) 20 mg tablet Take 20 mg by mouth 2 (two) times a day, Until Discontinued, Historical Med      repaglinide (PRANDIN) 2 mg tablet Take 2 mg by mouth 3 (three) times a day before meals  , Historical Med      tamsulosin (FLOMAX) 0 4 mg Take 0 4 mg by mouth 2 (two) times a day  , Until Discontinued, Historical Med       !! - Potential duplicate medications found  Please discuss with provider  No discharge procedures on file        ED Provider  Electronically Signed by         Holland Avila DO  01/14/19 8392

## 2019-01-15 LAB
ATRIAL RATE: 78 BPM
P AXIS: 68 DEGREES
PR INTERVAL: 124 MS
QRS AXIS: -79 DEGREES
QRSD INTERVAL: 144 MS
QT INTERVAL: 430 MS
QTC INTERVAL: 490 MS
T WAVE AXIS: 64 DEGREES
VENTRICULAR RATE: 78 BPM

## 2019-01-15 PROCEDURE — 93010 ELECTROCARDIOGRAM REPORT: CPT | Performed by: INTERNAL MEDICINE

## 2019-02-04 ENCOUNTER — TRANSCRIBE ORDERS (OUTPATIENT)
Dept: ADMINISTRATIVE | Facility: HOSPITAL | Age: 84
End: 2019-02-04

## 2019-02-04 ENCOUNTER — HOSPITAL ENCOUNTER (OUTPATIENT)
Dept: RADIOLOGY | Facility: HOSPITAL | Age: 84
Discharge: HOME | End: 2019-02-04
Payer: MEDICARE

## 2019-02-04 DIAGNOSIS — M51.36 DDD (DEGENERATIVE DISC DISEASE), LUMBAR: ICD-10-CM

## 2019-02-04 DIAGNOSIS — M51.36 DDD (DEGENERATIVE DISC DISEASE), LUMBAR: Primary | ICD-10-CM

## 2019-02-04 PROCEDURE — 72110 X-RAY EXAM L-2 SPINE 4/>VWS: CPT

## 2019-02-14 ENCOUNTER — HOSPITAL ENCOUNTER (OUTPATIENT)
Dept: RADIOLOGY | Facility: HOSPITAL | Age: 84
Discharge: HOME | End: 2019-02-14
Payer: MEDICARE

## 2019-02-14 ENCOUNTER — TRANSCRIBE ORDERS (OUTPATIENT)
Dept: ADMINISTRATIVE | Facility: HOSPITAL | Age: 84
End: 2019-02-14

## 2019-02-14 DIAGNOSIS — R52 PAIN: Primary | ICD-10-CM

## 2019-02-14 DIAGNOSIS — R52 PAIN: ICD-10-CM

## 2019-02-14 PROCEDURE — 73552 X-RAY EXAM OF FEMUR 2/>: CPT

## 2019-02-14 PROCEDURE — 73590 X-RAY EXAM OF LOWER LEG: CPT

## 2019-02-14 PROCEDURE — 72170 X-RAY EXAM OF PELVIS: CPT

## 2019-10-06 ENCOUNTER — APPOINTMENT (EMERGENCY)
Dept: CT IMAGING | Facility: HOSPITAL | Age: 84
DRG: 964 | End: 2019-10-06
Payer: MEDICARE

## 2019-10-06 ENCOUNTER — HOSPITAL ENCOUNTER (INPATIENT)
Facility: HOSPITAL | Age: 84
LOS: 5 days | Discharge: NURSING, REHAB OR LONG-TERM CARE | DRG: 964 | End: 2019-10-11
Attending: EMERGENCY MEDICINE | Admitting: INTERNAL MEDICINE
Payer: MEDICARE

## 2019-10-06 DIAGNOSIS — R33.9 URINARY RETENTION: ICD-10-CM

## 2019-10-06 DIAGNOSIS — N18.9 CKD (CHRONIC KIDNEY DISEASE): ICD-10-CM

## 2019-10-06 DIAGNOSIS — N40.0 ENLARGED PROSTATE: ICD-10-CM

## 2019-10-06 DIAGNOSIS — S32.591A INFERIOR PUBIC RAMUS FRACTURE, RIGHT, CLOSED, INITIAL ENCOUNTER (HCC): Primary | ICD-10-CM

## 2019-10-06 PROBLEM — D64.9 ANEMIA: Status: ACTIVE | Noted: 2019-10-06

## 2019-10-06 PROBLEM — N40.1 URINARY RETENTION DUE TO BENIGN PROSTATIC HYPERPLASIA: Status: ACTIVE | Noted: 2019-10-06

## 2019-10-06 PROBLEM — R26.2 AMBULATORY DYSFUNCTION: Status: ACTIVE | Noted: 2019-10-06

## 2019-10-06 PROBLEM — S32.501D CLOSED NONDISPLACED FRACTURE OF RIGHT PUBIS WITH ROUTINE HEALING: Status: ACTIVE | Noted: 2019-10-06

## 2019-10-06 PROBLEM — R33.8 URINARY RETENTION DUE TO BENIGN PROSTATIC HYPERPLASIA: Status: ACTIVE | Noted: 2019-10-06

## 2019-10-06 LAB
ALBUMIN SERPL BCP-MCNC: 3.6 G/DL (ref 3.5–5)
ALP SERPL-CCNC: 100 U/L (ref 46–116)
ALT SERPL W P-5'-P-CCNC: 22 U/L (ref 12–78)
ANION GAP SERPL CALCULATED.3IONS-SCNC: 8 MMOL/L (ref 4–13)
AST SERPL W P-5'-P-CCNC: 21 U/L (ref 5–45)
BACTERIA UR QL AUTO: NORMAL /HPF
BASOPHILS # BLD AUTO: 0.02 THOUSANDS/ΜL (ref 0–0.1)
BASOPHILS NFR BLD AUTO: 0 % (ref 0–1)
BILIRUB SERPL-MCNC: 0.4 MG/DL (ref 0.2–1)
BILIRUB UR QL STRIP: NEGATIVE
BUN SERPL-MCNC: 41 MG/DL (ref 5–25)
CALCIUM SERPL-MCNC: 9.7 MG/DL (ref 8.3–10.1)
CHLORIDE SERPL-SCNC: 102 MMOL/L (ref 100–108)
CLARITY UR: CLEAR
CO2 SERPL-SCNC: 29 MMOL/L (ref 21–32)
COLOR UR: YELLOW
CREAT SERPL-MCNC: 1.61 MG/DL (ref 0.6–1.3)
EOSINOPHIL # BLD AUTO: 0.08 THOUSAND/ΜL (ref 0–0.61)
EOSINOPHIL NFR BLD AUTO: 1 % (ref 0–6)
ERYTHROCYTE [DISTWIDTH] IN BLOOD BY AUTOMATED COUNT: 15.5 % (ref 11.6–15.1)
GFR SERPL CREATININE-BSD FRML MDRD: 37 ML/MIN/1.73SQ M
GLUCOSE SERPL-MCNC: 111 MG/DL (ref 65–140)
GLUCOSE SERPL-MCNC: 158 MG/DL (ref 65–140)
GLUCOSE UR STRIP-MCNC: ABNORMAL MG/DL
HCT VFR BLD AUTO: 36 % (ref 36.5–49.3)
HGB BLD-MCNC: 11.8 G/DL (ref 12–17)
HGB UR QL STRIP.AUTO: NEGATIVE
IMM GRANULOCYTES # BLD AUTO: 0.02 THOUSAND/UL (ref 0–0.2)
IMM GRANULOCYTES NFR BLD AUTO: 0 % (ref 0–2)
KETONES UR STRIP-MCNC: NEGATIVE MG/DL
LEUKOCYTE ESTERASE UR QL STRIP: ABNORMAL
LYMPHOCYTES # BLD AUTO: 1.07 THOUSANDS/ΜL (ref 0.6–4.47)
LYMPHOCYTES NFR BLD AUTO: 16 % (ref 14–44)
MCH RBC QN AUTO: 31.7 PG (ref 26.8–34.3)
MCHC RBC AUTO-ENTMCNC: 32.8 G/DL (ref 31.4–37.4)
MCV RBC AUTO: 97 FL (ref 82–98)
MONOCYTES # BLD AUTO: 0.85 THOUSAND/ΜL (ref 0.17–1.22)
MONOCYTES NFR BLD AUTO: 13 % (ref 4–12)
NEUTROPHILS # BLD AUTO: 4.53 THOUSANDS/ΜL (ref 1.85–7.62)
NEUTS SEG NFR BLD AUTO: 70 % (ref 43–75)
NITRITE UR QL STRIP: NEGATIVE
NON-SQ EPI CELLS URNS QL MICRO: NORMAL /HPF
PH UR STRIP.AUTO: 6 [PH]
PLATELET # BLD AUTO: 184 THOUSANDS/UL (ref 149–390)
PMV BLD AUTO: 9.3 FL (ref 8.9–12.7)
POTASSIUM SERPL-SCNC: 4.2 MMOL/L (ref 3.5–5.3)
PROT SERPL-MCNC: 7.3 G/DL (ref 6.4–8.2)
PROT UR STRIP-MCNC: NEGATIVE MG/DL
RBC # BLD AUTO: 3.72 MILLION/UL (ref 3.88–5.62)
RBC #/AREA URNS AUTO: NORMAL /HPF
SODIUM SERPL-SCNC: 139 MMOL/L (ref 136–145)
SP GR UR STRIP.AUTO: 1.01 (ref 1–1.03)
UROBILINOGEN UR QL STRIP.AUTO: 0.2 E.U./DL
WBC # BLD AUTO: 6.57 THOUSAND/UL (ref 4.31–10.16)
WBC #/AREA URNS AUTO: NORMAL /HPF

## 2019-10-06 PROCEDURE — 81001 URINALYSIS AUTO W/SCOPE: CPT | Performed by: EMERGENCY MEDICINE

## 2019-10-06 PROCEDURE — 80053 COMPREHEN METABOLIC PANEL: CPT | Performed by: EMERGENCY MEDICINE

## 2019-10-06 PROCEDURE — 85025 COMPLETE CBC W/AUTO DIFF WBC: CPT | Performed by: EMERGENCY MEDICINE

## 2019-10-06 PROCEDURE — 99222 1ST HOSP IP/OBS MODERATE 55: CPT | Performed by: INTERNAL MEDICINE

## 2019-10-06 PROCEDURE — 74176 CT ABD & PELVIS W/O CONTRAST: CPT

## 2019-10-06 PROCEDURE — 1124F ACP DISCUSS-NO DSCNMKR DOCD: CPT | Performed by: PHYSICIAN ASSISTANT

## 2019-10-06 PROCEDURE — 99285 EMERGENCY DEPT VISIT HI MDM: CPT | Performed by: EMERGENCY MEDICINE

## 2019-10-06 PROCEDURE — 99284 EMERGENCY DEPT VISIT MOD MDM: CPT

## 2019-10-06 PROCEDURE — 82948 REAGENT STRIP/BLOOD GLUCOSE: CPT

## 2019-10-06 PROCEDURE — 36415 COLL VENOUS BLD VENIPUNCTURE: CPT | Performed by: EMERGENCY MEDICINE

## 2019-10-06 PROCEDURE — 0T9B70Z DRAINAGE OF BLADDER WITH DRAINAGE DEVICE, VIA NATURAL OR ARTIFICIAL OPENING: ICD-10-PCS | Performed by: INTERNAL MEDICINE

## 2019-10-06 RX ORDER — LISINOPRIL 10 MG/1
10 TABLET ORAL DAILY
Status: DISCONTINUED | OUTPATIENT
Start: 2019-10-07 | End: 2019-10-11 | Stop reason: HOSPADM

## 2019-10-06 RX ORDER — FINASTERIDE 5 MG/1
5 TABLET, FILM COATED ORAL DAILY
Status: DISCONTINUED | OUTPATIENT
Start: 2019-10-07 | End: 2019-10-11 | Stop reason: HOSPADM

## 2019-10-06 RX ORDER — TAMSULOSIN HYDROCHLORIDE 0.4 MG/1
0.4 CAPSULE ORAL
Status: DISCONTINUED | OUTPATIENT
Start: 2019-10-06 | End: 2019-10-07

## 2019-10-06 RX ORDER — FUROSEMIDE 20 MG/1
20 TABLET ORAL DAILY
Status: DISCONTINUED | OUTPATIENT
Start: 2019-10-07 | End: 2019-10-11 | Stop reason: HOSPADM

## 2019-10-06 RX ORDER — MELATONIN
1000 DAILY
Status: DISCONTINUED | OUTPATIENT
Start: 2019-10-07 | End: 2019-10-11 | Stop reason: HOSPADM

## 2019-10-06 RX ORDER — ACETAMINOPHEN 325 MG/1
650 TABLET ORAL EVERY 6 HOURS
Status: DISCONTINUED | OUTPATIENT
Start: 2019-10-06 | End: 2019-10-11 | Stop reason: HOSPADM

## 2019-10-06 RX ORDER — ATORVASTATIN CALCIUM 10 MG/1
10 TABLET, FILM COATED ORAL DAILY
Status: DISCONTINUED | OUTPATIENT
Start: 2019-10-07 | End: 2019-10-11 | Stop reason: HOSPADM

## 2019-10-06 RX ORDER — HEPARIN SODIUM 5000 [USP'U]/ML
5000 INJECTION, SOLUTION INTRAVENOUS; SUBCUTANEOUS EVERY 8 HOURS SCHEDULED
Status: DISCONTINUED | OUTPATIENT
Start: 2019-10-06 | End: 2019-10-11 | Stop reason: HOSPADM

## 2019-10-06 RX ADMIN — ACETAMINOPHEN 650 MG: 325 TABLET, FILM COATED ORAL at 21:37

## 2019-10-06 RX ADMIN — HEPARIN SODIUM 5000 UNITS: 5000 INJECTION INTRAVENOUS; SUBCUTANEOUS at 21:36

## 2019-10-06 RX ADMIN — INSULIN LISPRO 1 UNITS: 100 INJECTION, SOLUTION INTRAVENOUS; SUBCUTANEOUS at 21:45

## 2019-10-06 NOTE — ASSESSMENT & PLAN NOTE
Incidental finding of a closed nondisplaced fracture of the right pubic rami    - WBAT for now  - Pt/OT  - DVT prophylaxis  - Pain control  - Consult ortho

## 2019-10-06 NOTE — ASSESSMENT & PLAN NOTE
Patient presenting with acute urinary retention, most likely secondary to BPH, status post Tovar catheter placement in the ER  Continue with home medications Flomax and finasteride  Consult urology

## 2019-10-06 NOTE — H&P
H&P- Cassy Ruiz 9/14/1927, 80 y o  male MRN: [id number]    Unit/Bed#: ED 11 Encounter: [id number]    Primary Care Provider: Mary Rodriguez MD   Date and time admitted to hospital: 10/6/2019  3:02 PM        * Ambulatory dysfunction  Assessment & Plan  Patient presenting with ambulatory dysfunction, found to have a right pelvic fracture    Plan  · Consult orthopedics  · WBAT  · Pain control with tylenol   · Lidocaine patch  · PT/OT  · CM    Urinary retention due to benign prostatic hyperplasia  Assessment & Plan  Patient presenting with acute urinary retention, most likely secondary to BPH, status post Tovar catheter placement in the ER  Continue with home medications Flomax and finasteride  Consult urology    Closed displaced fracture of right pubis with routine healing  Assessment & Plan  Incidental finding of a closed nondisplaced fracture of the right pubic rami    - WBAT for now  - Pt/OT  - DVT prophylaxis  - Pain control  - Consult ortho    Anemia  Assessment & Plan  Normocytic normochromic anemia, Hb 11 8, baseline 11 2 -12 7  -Check Iron panel, B12 and folate  - Keep Hb > 7    Acute kidney injury superimposed on chronic kidney disease (HCC)  Assessment & Plan  Acute on chronic kidney disease, with creatinine elevated to 1 61, baseline creatinine 1 59, most likely secondary to dehydration  · Hydrate and trend BMP daily    Diabetes mellitus ([facility] [address] )  Assessment & Plan    Lab Results   Component Value Date    HGBA1C 7 3 (H) 01/25/2018     History of diabetes on metformin, glipizide, repaglinide and Canaglifozin  Last A1c 2018 was 7 3  Check A1c  Place on SSI, Accucheks and basal insulin for now  Given history of CKD, would hold off on giving patient any of his current meds    Hypertension  Assessment & Plan  History of hypertension on Lasix and quinapril at home    Continue with home medications  Monitor blood pressure per unit protocol    Edema  Assessment & Plan  Chronic bilateral lower extremity pitting pedal edema, might be secondary to lymphatic obstruction  No record of CHF seen  Observe for now, continue with home dose lasix        VTE Prophylaxis: Heparin  / sequential compression device   Code Status: Full code  POLST: There is no POLST form on file for this patient (pre-hospital)  Discussion with family: There is no family on file to discuss with and patient does not know their numbers    Anticipated Length of Stay:  Patient will be admitted on an Inpatient basis with an anticipated length of stay of 2 midnights  Justification for Hospital Stay: ambulatory dysfunction, pubic fracture and urinary retention    Total Time for Visit, including Counseling / Coordination of Care: 30 minutes  Greater than 50% of this total time spent on direct patient counseling and coordination of care  Chief Complaint:   Ambulatory dysfunction    History of Present Illness:    Ronnie Arce is a 80 y o  male who presents with ambulatory dysfunction  Patient is a very poor historian and history is gotten from his friend who brought him to the ER  Patient's friend reports that patient has been declining over the past couple of days and yesterday noticed that the patient could not walk well and also could not urinate, thus he brought him to the ER  In the ER, patient was hemodynamically stable  Labs showed creatinine of 1 61, hemoglobin 11 8, CT scan abdomen pelvis showed a minimally displaced right inferior pubic ramus fracture, cholelithiasis and enlarged prostate with moderate distention of the urinary bladder  Tovar was placed in the ER        Review of Systems:    Review of Systems   Unable to perform ROS: Dementia (Patient confused and unable to give an accurate history or review of systems)       Past Medical and Surgical History:     Past Medical History:   Diagnosis Date    Amblyopia     Diabetes mellitus ([facility] [address] )     Elevated PSA     Hard of hearing     Hyperlipidemia     Hypertension        Past Surgical History:   Procedure Laterality Date    HERNIA REPAIR         Meds/Allergies:    Prior to Admission medications    Medication Sig Start Date End Date Taking? Authorizing Provider   atorvastatin (LIPITOR) 10 mg tablet Take 10 mg by mouth daily    Historical Provider, MD   canagliflozin (INVOKANA) 100 mg Take 300 mg by mouth daily before breakfast      Historical Provider, MD   Cholecalciferol (VITAMIN D) 2000 UNITS CAPS Take 2,000 Units by mouth daily with breakfast    Historical Provider, MD   finasteride (PROSCAR) 5 mg tablet Take 5 mg by mouth daily    Historical Provider, MD   furosemide (LASIX) 20 mg tablet Take 1 tablet by mouth daily for 30 days  Patient taking differently: Take 20 mg by mouth every other day   10/3/16 11/2/16  Andrzej Moyer MD   glipiZIDE (GLUCOTROL) 10 mg tablet Take 10 mg by mouth 2 (two) times a day before meals    Historical Provider, MD   metFORMIN (GLUCOPHAGE) 500 mg tablet Take 1,000 mg by mouth 2 (two) times a day with meals      Historical Provider, MD   metFORMIN (GLUCOPHAGE) 500 mg tablet Take 500 mg by mouth daily with lunch    Historical Provider, MD   quinapril (ACCUPRIL) 20 mg tablet Take 20 mg by mouth 2 (two) times a day    Historical Provider, MD   repaglinide (PRANDIN) 2 mg tablet Take 2 mg by mouth 3 (three) times a day before meals      Historical Provider, MD   tamsulosin (FLOMAX) 0 4 mg Take 0 4 mg by mouth 2 (two) times a day      Historical Provider, MD     I have reviewed home medications using allscripts      Allergies: No Known Allergies    Social History:     Marital Status: Single   Occupation: Retired   Patient Pre-hospital Living Situation: lives in a [facility] home with fellow priests  Patient Pre-hospital Level of Mobility: Uses a walker  Patient Pre-hospital Diet Restrictions: None  Substance Use History:   Social History     Substance and Sexual Activity   Alcohol Use Yes    Comment: mague for "big occasions"      Social History Tobacco Use   Smoking Status Never Smoker   Smokeless Tobacco Never Used     Social History     Substance and Sexual Activity   Drug Use No       Family History:    History reviewed  No pertinent family history  Physical Exam:     Vitals:   Blood Pressure: 142/67 (10/06/19 1700)  Pulse: 82 (10/06/19 1700)  Temperature: (!) 96 6 °F (35 9 °C) (10/06/19 1503)  Respirations: 18 (10/06/19 1700)  Weight - Scale: 61 kg (134 lb 7 7 oz) (10/06/19 1515)  SpO2: 99 % (10/06/19 1700)    Physical Exam   Constitutional: No distress  Eyes: Right eye exhibits no discharge  Left eye exhibits no discharge  No scleral icterus  Neck: Neck supple  Cardiovascular: Normal rate and regular rhythm  Exam reveals no gallop and no friction rub  Murmur heard  Pulmonary/Chest: Effort normal and breath sounds normal  No stridor  No respiratory distress  He has no wheezes  Abdominal: Soft  Bowel sounds are normal  He exhibits no distension  There is no tenderness  There is no guarding  Musculoskeletal: He exhibits edema (2+ bilateral lower extremity edema to mid shin)  He exhibits no tenderness  Neurological: He is alert  Oriented to self and place, could tell me the month, but thinks Derrick Reyna is still the president  He could not understand why he was admitted   Skin: Skin is warm  He is not diaphoretic  Additional Data:     Lab Results: I have personally reviewed pertinent reports        Results from last 7 days   Lab Units 10/06/19  1527   WBC Thousand/uL 6 57   HEMOGLOBIN g/dL 11 8*   HEMATOCRIT % 36 0*   PLATELETS Thousands/uL 184   NEUTROS PCT % 70   LYMPHS PCT % 16   MONOS PCT % 13*   EOS PCT % 1     Results from last 7 days   Lab Units 10/06/19  1527   SODIUM mmol/L 139   POTASSIUM mmol/L 4 2   CHLORIDE mmol/L 102   CO2 mmol/L 29   BUN mg/dL 41*   CREATININE mg/dL 1 61*   ANION GAP mmol/L 8   CALCIUM mg/dL 9 7   ALBUMIN g/dL 3 6   TOTAL BILIRUBIN mg/dL 0 40   ALK PHOS U/L 100   ALT U/L 22   AST U/L 21   GLUCOSE RANDOM mg/dL 111                       Imaging: I have personally reviewed pertinent reports  CT abdomen pelvis wo contrast   Final Result by Kita Queen MD (10/06 1711)      1  Minimally displaced right inferior pubic ramus fracture without evidence of healing, suggesting acuity  2   Cholelithiasis without evidence of cholecystitis  3   Enlarged prostate gland with moderate distention of the urinary bladder  The study was marked in [facility] for immediate notification  Workstation performed: [id number]             EKG, Pathology, and Other Studies Reviewed on Admission:   · EKG: None done in ER    Allscripts / Epic Records Reviewed: Yes     ** Please Note: This note has been constructed using a voice recognition system   **

## 2019-10-06 NOTE — ASSESSMENT & PLAN NOTE
Chronic bilateral lower extremity pitting pedal edema, might be secondary to lymphatic obstruction  No record of CHF seen  Observe for now, continue with home dose lasix

## 2019-10-06 NOTE — ASSESSMENT & PLAN NOTE
Normocytic normochromic anemia, Hb 11 8, baseline 11 2 -12 7  -Check Iron panel, B12 and folate  - Keep Hb > 7

## 2019-10-06 NOTE — ASSESSMENT & PLAN NOTE
History of hypertension on Lasix and quinapril at home    Continue with home medications  Monitor blood pressure per unit protocol

## 2019-10-06 NOTE — ASSESSMENT & PLAN NOTE
Acute on chronic kidney disease, with creatinine elevated to 1 61, baseline creatinine 1 59, most likely secondary to dehydration    · Hydrate and trend BMP daily

## 2019-10-06 NOTE — ASSESSMENT & PLAN NOTE
Patient presenting with ambulatory dysfunction, found to have a right pelvic fracture    Plan  · Consult orthopedics  · WBAT  · Pain control with tylenol   · Lidocaine patch  · PT/OT  · CM

## 2019-10-06 NOTE — ASSESSMENT & PLAN NOTE
Lab Results   Component Value Date    HGBA1C 7 3 (H) 01/25/2018     History of diabetes on metformin, glipizide, repaglinide and Canaglifozin    Last A1c 2018 was 7 3  Check A1c  Place on SSI, Accucheks and basal insulin for now  Given history of CKD, would hold off on giving patient any of his current meds

## 2019-10-07 ENCOUNTER — TELEPHONE (OUTPATIENT)
Dept: UROLOGY | Facility: HOSPITAL | Age: 84
End: 2019-10-07

## 2019-10-07 ENCOUNTER — APPOINTMENT (INPATIENT)
Dept: RADIOLOGY | Facility: HOSPITAL | Age: 84
DRG: 964 | End: 2019-10-07
Payer: MEDICARE

## 2019-10-07 LAB
ANION GAP SERPL CALCULATED.3IONS-SCNC: 12 MMOL/L (ref 4–13)
BUN SERPL-MCNC: 37 MG/DL (ref 5–25)
CALCIUM SERPL-MCNC: 9.3 MG/DL (ref 8.3–10.1)
CHLORIDE SERPL-SCNC: 104 MMOL/L (ref 100–108)
CO2 SERPL-SCNC: 25 MMOL/L (ref 21–32)
CREAT SERPL-MCNC: 1.45 MG/DL (ref 0.6–1.3)
ERYTHROCYTE [DISTWIDTH] IN BLOOD BY AUTOMATED COUNT: 14.6 % (ref 11.6–15.1)
EST. AVERAGE GLUCOSE BLD GHB EST-MCNC: 146 MG/DL
FERRITIN SERPL-MCNC: 51 NG/ML (ref 8–388)
FOLATE SERPL-MCNC: 14.7 NG/ML (ref 3.1–17.5)
GFR SERPL CREATININE-BSD FRML MDRD: 42 ML/MIN/1.73SQ M
GLUCOSE SERPL-MCNC: 160 MG/DL (ref 65–140)
GLUCOSE SERPL-MCNC: 171 MG/DL (ref 65–140)
GLUCOSE SERPL-MCNC: 345 MG/DL (ref 65–140)
GLUCOSE SERPL-MCNC: 86 MG/DL (ref 65–140)
GLUCOSE SERPL-MCNC: 91 MG/DL (ref 65–140)
HBA1C MFR BLD: 6.7 % (ref 4.2–6.3)
HCT VFR BLD AUTO: 34.5 % (ref 36.5–49.3)
HGB BLD-MCNC: 11.4 G/DL (ref 12–17)
IRON SATN MFR SERPL: 14 %
IRON SERPL-MCNC: 37 UG/DL (ref 65–175)
MCH RBC QN AUTO: 31.2 PG (ref 26.8–34.3)
MCHC RBC AUTO-ENTMCNC: 33 G/DL (ref 31.4–37.4)
MCV RBC AUTO: 95 FL (ref 82–98)
PLATELET # BLD AUTO: 167 THOUSANDS/UL (ref 149–390)
PMV BLD AUTO: 9.6 FL (ref 8.9–12.7)
POTASSIUM SERPL-SCNC: 3.8 MMOL/L (ref 3.5–5.3)
RBC # BLD AUTO: 3.65 MILLION/UL (ref 3.88–5.62)
SODIUM SERPL-SCNC: 141 MMOL/L (ref 136–145)
TIBC SERPL-MCNC: 256 UG/DL (ref 250–450)
VIT B12 SERPL-MCNC: 254 PG/ML (ref 100–900)
WBC # BLD AUTO: 5.98 THOUSAND/UL (ref 4.31–10.16)

## 2019-10-07 PROCEDURE — 82607 VITAMIN B-12: CPT | Performed by: INTERNAL MEDICINE

## 2019-10-07 PROCEDURE — G8988 SELF CARE GOAL STATUS: HCPCS

## 2019-10-07 PROCEDURE — 99232 SBSQ HOSP IP/OBS MODERATE 35: CPT | Performed by: INTERNAL MEDICINE

## 2019-10-07 PROCEDURE — 99222 1ST HOSP IP/OBS MODERATE 55: CPT | Performed by: ORTHOPAEDIC SURGERY

## 2019-10-07 PROCEDURE — 97163 PT EVAL HIGH COMPLEX 45 MIN: CPT

## 2019-10-07 PROCEDURE — 83540 ASSAY OF IRON: CPT | Performed by: INTERNAL MEDICINE

## 2019-10-07 PROCEDURE — 83550 IRON BINDING TEST: CPT | Performed by: INTERNAL MEDICINE

## 2019-10-07 PROCEDURE — 85027 COMPLETE CBC AUTOMATED: CPT | Performed by: INTERNAL MEDICINE

## 2019-10-07 PROCEDURE — 80048 BASIC METABOLIC PNL TOTAL CA: CPT | Performed by: INTERNAL MEDICINE

## 2019-10-07 PROCEDURE — 82728 ASSAY OF FERRITIN: CPT | Performed by: INTERNAL MEDICINE

## 2019-10-07 PROCEDURE — 82746 ASSAY OF FOLIC ACID SERUM: CPT | Performed by: INTERNAL MEDICINE

## 2019-10-07 PROCEDURE — 97167 OT EVAL HIGH COMPLEX 60 MIN: CPT

## 2019-10-07 PROCEDURE — 99222 1ST HOSP IP/OBS MODERATE 55: CPT | Performed by: UROLOGY

## 2019-10-07 PROCEDURE — G8978 MOBILITY CURRENT STATUS: HCPCS

## 2019-10-07 PROCEDURE — G8979 MOBILITY GOAL STATUS: HCPCS

## 2019-10-07 PROCEDURE — 82948 REAGENT STRIP/BLOOD GLUCOSE: CPT

## 2019-10-07 PROCEDURE — G8987 SELF CARE CURRENT STATUS: HCPCS

## 2019-10-07 PROCEDURE — 83036 HEMOGLOBIN GLYCOSYLATED A1C: CPT | Performed by: INTERNAL MEDICINE

## 2019-10-07 PROCEDURE — 72170 X-RAY EXAM OF PELVIS: CPT

## 2019-10-07 RX ORDER — TAMSULOSIN HYDROCHLORIDE 0.4 MG/1
0.4 CAPSULE ORAL 2 TIMES DAILY
Status: DISCONTINUED | OUTPATIENT
Start: 2019-10-08 | End: 2019-10-07

## 2019-10-07 RX ORDER — TAMSULOSIN HYDROCHLORIDE 0.4 MG/1
0.4 CAPSULE ORAL 2 TIMES DAILY
Status: DISCONTINUED | OUTPATIENT
Start: 2019-10-07 | End: 2019-10-11 | Stop reason: HOSPADM

## 2019-10-07 RX ORDER — ASPIRIN 81 MG/1
81 TABLET, CHEWABLE ORAL DAILY
COMMUNITY

## 2019-10-07 RX ADMIN — HEPARIN SODIUM 5000 UNITS: 5000 INJECTION INTRAVENOUS; SUBCUTANEOUS at 05:23

## 2019-10-07 RX ADMIN — MELATONIN 1000 UNITS: at 09:01

## 2019-10-07 RX ADMIN — INSULIN LISPRO 1 UNITS: 100 INJECTION, SOLUTION INTRAVENOUS; SUBCUTANEOUS at 22:28

## 2019-10-07 RX ADMIN — FUROSEMIDE 20 MG: 20 TABLET ORAL at 09:01

## 2019-10-07 RX ADMIN — ATORVASTATIN CALCIUM 10 MG: 10 TABLET, FILM COATED ORAL at 09:02

## 2019-10-07 RX ADMIN — HEPARIN SODIUM 5000 UNITS: 5000 INJECTION INTRAVENOUS; SUBCUTANEOUS at 14:50

## 2019-10-07 RX ADMIN — HEPARIN SODIUM 5000 UNITS: 5000 INJECTION INTRAVENOUS; SUBCUTANEOUS at 22:29

## 2019-10-07 RX ADMIN — ACETAMINOPHEN 650 MG: 325 TABLET, FILM COATED ORAL at 14:50

## 2019-10-07 RX ADMIN — ACETAMINOPHEN 650 MG: 325 TABLET, FILM COATED ORAL at 18:54

## 2019-10-07 RX ADMIN — LISINOPRIL 10 MG: 10 TABLET ORAL at 09:02

## 2019-10-07 RX ADMIN — INSULIN LISPRO 4 UNITS: 100 INJECTION, SOLUTION INTRAVENOUS; SUBCUTANEOUS at 12:48

## 2019-10-07 RX ADMIN — ACETAMINOPHEN 650 MG: 325 TABLET, FILM COATED ORAL at 09:01

## 2019-10-07 RX ADMIN — FINASTERIDE 5 MG: 5 TABLET, FILM COATED ORAL at 09:01

## 2019-10-07 RX ADMIN — TAMSULOSIN HYDROCHLORIDE 0.4 MG: 0.4 CAPSULE ORAL at 18:54

## 2019-10-07 RX ADMIN — INSULIN LISPRO 1 UNITS: 100 INJECTION, SOLUTION INTRAVENOUS; SUBCUTANEOUS at 17:21

## 2019-10-07 NOTE — ASSESSMENT & PLAN NOTE
Incidental finding of a closed nondisplaced fracture of the right pubic rami on CT scan pelvis, unable to determine aetiology of fracture  Patient is a poor historian and does not recollect any recent falls, although he does have scattered mild bruises on his legs      Plan  - WBAT for now  - Pt/OT  - DVT prophylaxis  - Pain control  - Consult ortho

## 2019-10-07 NOTE — ASSESSMENT & PLAN NOTE
Patient presenting with acute urinary retention, most likely secondary to BPH, status post Tovar catheter placement in the ER  Continue with home medications Flomax and finasteride  Per Urology, okay to discharge with Tovar

## 2019-10-07 NOTE — PROGRESS NOTES
Progress Note - Monica Johnson 9/14/1927, 80 y o  male MRN: [id number]    Unit/Bed#: [address] [place] [address] Encounter: [id number]    Primary Care Provider: Martín Watson MD   Date and time admitted to hospital: 10/6/2019  3:02 PM        * Ambulatory dysfunction  Assessment & Plan  Patient presenting with ambulatory dysfunction, found to have a right pelvic fracture    Plan  · WBAT  · Pain control with tylenol   · Lidocaine patch  · PT/OT  · CM consult  · Ortho consulted, await recs    Urinary retention due to benign prostatic hyperplasia  Assessment & Plan  Patient presenting with acute urinary retention, most likely secondary to BPH, status post Tovar catheter placement in the ER  Continue with home medications Flomax and finasteride  Per Urology, okay to discharge with Tovar    Closed displaced fracture of right pubis with routine healing  Assessment & Plan  Incidental finding of a closed nondisplaced fracture of the right pubic rami on CT scan pelvis, unable to determine aetiology of fracture  Patient is a poor historian and does not recollect any recent falls, although he does have scattered mild bruises on his legs  Plan  - WBAT for now  - Pt/OT  - DVT prophylaxis  - Pain control  - Consult ortho    Anemia  Assessment & Plan  Normocytic normochromic anemia, Hb 11 8, baseline 11 2 -12 7  -follow Iron panel, B12 and folate results  - Keep Hb > 7    Chronic kidney disease (CKD), active medical management without dialysis, stage 3 (moderate) (HCC)  Assessment & Plan  Patient with history of chronic kidney disease, on admission, creatinine was elevated to 1 61, but GFR stayed the same  · Baseline creatinine 1 59  · Hydrate and trend BMP daily    Diabetes mellitus ([facility] [address] )  Assessment & Plan    Lab Results   Component Value Date    HGBA1C 6 7 (H) 10/07/2019     History of diabetes on metformin, glipizide, repaglinide and Canaglifozin    A1c 6 7  Continue with SSI, and Accucheks      Hypertension  Assessment & Plan  History of hypertension on Lasix and quinapril at home  Quinapril switched to lisinopril  Monitor blood pressure per unit protocol    Edema  Assessment & Plan  Chronic bilateral lower extremity pitting pedal edema, right greater than left, might be secondary to lymphatic obstruction  No record of CHF seen  Observe for now, continue with home dose lasix      VTE Pharmacologic Prophylaxis:   Pharmacologic: Heparin  Mechanical VTE Prophylaxis in Place: Yes    Patient Centered Rounds: I have performed bedside rounds with nursing staff today  Discussions with Specialists or Other Care Team Provider:  Discussed with Urology    Education and Discussions with Family / Patient:  Discussed with patient, friend's phone number called but he is not picking up    Time Spent for Care: 30 minutes  More than 50% of total time spent on counseling and coordination of care as described above  Current Length of Stay: 1 day(s)    Current Patient Status: Inpatient   Certification Statement: The patient will continue to require additional inpatient hospital stay due to Right pubic rami fracture    Discharge Plan:  Tomorrow    Code Status: Level 1 - Full Code      Subjective:   No overnight events  Patient feels fine this morning  Has no complaints    Objective:     Vitals:   Temp (24hrs), Av 7 °F (36 5 °C), Min:96 6 °F (35 9 °C), Max:98 4 °F (36 9 °C)    Temp:  [96 6 °F (35 9 °C)-98 4 °F (36 9 °C)] 98 °F (36 7 °C)  HR:  [59-86] 60  Resp:  [16-19] 16  BP: (128-152)/(59-71) 128/59  SpO2:  [97 %-100 %] 97 %  Body mass index is 19 07 kg/m²  Input and Output Summary (last 24 hours): Intake/Output Summary (Last 24 hours) at 10/7/2019 1344  Last data filed at 10/7/2019 0501  Gross per 24 hour   Intake    Output 1100 ml   Net -1100 ml       Physical Exam:     Physical Exam   Constitutional: No distress  Cardiovascular: Normal rate and regular rhythm  Exam reveals no friction rub  Murmur heard    Pulmonary/Chest: Effort normal and breath sounds normal  No stridor  No respiratory distress  He has no wheezes  Abdominal: Soft  Bowel sounds are normal  He exhibits no distension  Neurological: He is alert  Oriented x 2   Skin: Skin is warm  He is not diaphoretic  Additional Data:     Labs:    Results from last 7 days   Lab Units 10/07/19  0504 10/06/19  1527   WBC Thousand/uL 5 98 6 57   HEMOGLOBIN g/dL 11 4* 11 8*   HEMATOCRIT % 34 5* 36 0*   PLATELETS Thousands/uL 167 184   NEUTROS PCT %  --  70   LYMPHS PCT %  --  16   MONOS PCT %  --  13*   EOS PCT %  --  1     Results from last 7 days   Lab Units 10/07/19  0504 10/06/19  1527   SODIUM mmol/L 141 139   POTASSIUM mmol/L 3 8 4 2   CHLORIDE mmol/L 104 102   CO2 mmol/L 25 29   BUN mg/dL 37* 41*   CREATININE mg/dL 1 45* 1 61*   ANION GAP mmol/L 12 8   CALCIUM mg/dL 9 3 9 7   ALBUMIN g/dL  --  3 6   TOTAL BILIRUBIN mg/dL  --  0 40   ALK PHOS U/L  --  100   ALT U/L  --  22   AST U/L  --  21   GLUCOSE RANDOM mg/dL 91 111         Results from last 7 days   Lab Units 10/07/19  1107 10/07/19  0733 10/06/19  2131   POC GLUCOSE mg/dl 345* 86 158*     Results from last 7 days   Lab Units 10/07/19  0504   HEMOGLOBIN A1C % 6 7*               * I Have Reviewed All Lab Data Listed Above  * Additional Pertinent Lab Tests Reviewed:  All Labs Within Last 24 Hours Reviewed    Imaging:    Imaging Reports Reviewed Today Include:   Imaging Personally Reviewed by Myself Includes:      Recent Cultures (last 7 days):           Last 24 Hours Medication List:     Current Facility-Administered Medications:  acetaminophen 650 mg Oral Q6H Eloisa Chaves MD   atorvastatin 10 mg Oral Daily Eloisa Chaves MD   cholecalciferol 1,000 Units Oral Daily Eloisa Chaves MD   finasteride 5 mg Oral Daily Eloisa Chaves MD   furosemide 20 mg Oral Daily Eloisa Chaves MD   heparin (porcine) 5,000 Units Subcutaneous Q8H [address] Eloisa Chaves MD   insulin lispro 1-5 Units Subcutaneous TID AC Gregg Ruffin MD   insulin lispro 1-5 Units Subcutaneous HS Gregg Ruffin MD   lisinopril 10 mg Oral Daily Gregg Ruffin MD   tamsulosin 0 4 mg Oral Daily With Anish Lees MD        Today, Patient Was Seen By: Gregg Ruffin MD    ** Please Note: Dictation voice to text software may have been used in the creation of this document   ** normal...

## 2019-10-07 NOTE — UTILIZATION REVIEW
Initial Clinical Review    Admission: Date/Time/Statement: Inpatient Admission Orders (From admission, onward)     Ordered        10/06/19 1802  Inpatient Admission (expected length of stay for this patient Order details is greater than two midnights)  Once                   Orders Placed This Encounter   Procedures    Inpatient Admission (expected length of stay for this patient Order details is greater than two midnights)     Standing Status:   Standing     Number of Occurrences:   1     Order Specific Question:   Admitting Physician     Answer:   Zac Low     Order Specific Question:   Level of Care     Answer:   Med Surg [16]     Order Specific Question:   Estimated length of stay     Answer:   More than 2 Midnights     Order Specific Question:   Certification     Answer:   I certify that inpatient services are medically necessary for this patient for a duration of greater than two midnights  See H&P and MD Progress Notes for additional information about the patient's course of treatment  ED Arrival Information     Expected Arrival Acuity Means of Arrival Escorted By Service Admission Type    - 10/6/2019 14:42 Urgent Wheelchair Self General Medicine Urgent    Arrival Complaint    possible UTI        Chief Complaint   Patient presents with    Possible UTI     pt presents to ER with staff stating he has been weak with urinary frequency   staff state they think he might have a UTI     Assessment/Plan: Ambulatory dysfunction  Assessment & Plan  Patient presenting with ambulatory dysfunction, found to have a right pelvic fracture     Plan  · Consult orthopedics  · WBAT  · Pain control with tylenol   · Lidocaine patch  · PT/OT  · CM     Urinary retention due to benign prostatic hyperplasia  Assessment & Plan  Patient presenting with acute urinary retention, most likely secondary to BPH, status post Tovar catheter placement in the ER  Continue with home medications Flomax and finasteride  Consult urology     Closed displaced fracture of right pubis with routine healing  Assessment & Plan  Incidental finding of a closed nondisplaced fracture of the right pubic rami     - WBAT for now  - Pt/OT  - DVT prophylaxis  - Pain control  - Consult ortho     Anemia  Assessment & Plan  Normocytic normochromic anemia, Hb 11 8, baseline 11 2 -12 7  -Check Iron panel, B12 and folate  - Keep Hb > 7     Acute kidney injury superimposed on chronic kidney disease (HCC)  Assessment & Plan  Acute on chronic kidney disease, with creatinine elevated to 1 61, baseline creatinine 1 59, most likely secondary to dehydration  · Hydrate and trend BMP daily     Diabetes mellitus ([facility] [address] )  Assessment & Plan           Lab Results   Component Value Date     HGBA1C 7 3 (H) 01/25/2018      History of diabetes on metformin, glipizide, repaglinide and Canaglifozin  Last A1c 2018 was 7 3  Check A1c  Place on SSI, Accucheks and basal insulin for now  Given history of CKD, would hold off on giving patient any of his current meds     Hypertension  Assessment & Plan  History of hypertension on Lasix and quinapril at home    Continue with home medications  Monitor blood pressure per unit protocol     Edema  Assessment & Plan  Chronic bilateral lower extremity pitting pedal edema, might be secondary to lymphatic obstruction  No record of CHF seen  Observe for now, continue with home dose lasix       ED Triage Vitals   Temperature Pulse Respirations Blood Pressure SpO2   10/06/19 1503 10/06/19 1503 10/06/19 1503 10/06/19 1503 10/06/19 1503   (!) 96 6 °F (35 9 °C) 75 19 152/66 100 %      Temp Source Heart Rate Source Patient Position - Orthostatic VS BP Location FiO2 (%)   10/06/19 1900 10/06/19 1503 10/06/19 1503 10/06/19 1503 --   Oral Monitor Lying Right arm       Pain Score       10/06/19 1503       1        Wt Readings from Last 1 Encounters:   10/06/19 53 6 kg (118 lb 2 7 oz)     Additional Vital Signs:   10/07/19 0812  98 °F (36 7 °C)  60  16  128/59  97 %  None (Room air)  Lying   10/06/19 2300  98 4 °F (36 9 °C)  59  18  132/63    None (Room air)  Lying   10/06/19 1900  97 8 °F (36 6 °C)  86  18  143/71  100 %  None (Room air)         Pertinent Labs/Diagnostic Test Results:   10/6/2019  CT abdomen -  Minimally displaced right inferior pubic ramus fracture without evidence of healing, suggesting acuity  2   Cholelithiasis without evidence of cholecystitis    3   Enlarged prostate gland with moderate distention of the urinary bladder    10/7/2019  Xray pelvis - Slightly displaced right inferior pubic ramus fracture similar to prior CT  Results from last 7 days   Lab Units 10/07/19  0504 10/06/19  1527   WBC Thousand/uL 5 98 6 57   HEMOGLOBIN g/dL 11 4* 11 8*   HEMATOCRIT % 34 5* 36 0*   PLATELETS Thousands/uL 167 184   NEUTROS ABS Thousands/µL  --  4 53     Results from last 7 days   Lab Units 10/07/19  0504 10/06/19  1527   SODIUM mmol/L 141 139   POTASSIUM mmol/L 3 8 4 2   CHLORIDE mmol/L 104 102   CO2 mmol/L 25 29   ANION GAP mmol/L 12 8   BUN mg/dL 37* 41*   CREATININE mg/dL 1 45* 1 61*   EGFR ml/min/1 73sq m 42 37   CALCIUM mg/dL 9 3 9 7     Results from last 7 days   Lab Units 10/06/19  1527   AST U/L 21   ALT U/L 22   ALK PHOS U/L 100   TOTAL PROTEIN g/dL 7 3   ALBUMIN g/dL 3 6   TOTAL BILIRUBIN mg/dL 0 40     Results from last 7 days   Lab Units 10/07/19  1107 10/07/19  0733 10/06/19  2131   POC GLUCOSE mg/dl 345* 86 158*     Results from last 7 days   Lab Units 10/07/19  0504 10/06/19  1527   GLUCOSE RANDOM mg/dL 91 111     Results from last 7 days   Lab Units 10/07/19  0504   HEMOGLOBIN A1C % 6 7*   EAG mg/dl 146     Results from last 7 days   Lab Units 10/07/19  0504   FERRITIN ng/mL 51     Results from last 7 days   Lab Units 10/06/19  1717   CLARITY UA  Clear   COLOR UA  Yellow   SPEC GRAV UA  1 010   PH UA  6 0   GLUCOSE UA mg/dl >=1000 (1%)*   KETONES UA mg/dl Negative   BLOOD UA  Negative   PROTEIN UA mg/dl Negative NITRITE UA  Negative   BILIRUBIN UA  Negative   UROBILINOGEN UA E U /dl 0 2   LEUKOCYTES UA  Elevated glucose may cause decreased leukocyte values  See urine microscopic for [facility] result/*   WBC UA /hpf None Seen   RBC UA /hpf None Seen   BACTERIA UA /hpf None Seen   EPITHELIAL CELLS WET PREP /hpf None Seen     ED Treatment:   Medication Administration from 10/06/2019 1441 to 10/06/2019 1900     None        Past Medical History:   Diagnosis Date    Amblyopia     Diabetes mellitus ([facility] )     Elevated PSA     Hard of hearing     Hyperlipidemia     Hypertension      Present on Admission:   Hypertension   Diabetes mellitus ([facility] )   Acute kidney injury superimposed on chronic kidney disease ([facility] )   Urinary retention due to benign prostatic hyperplasia   Ambulatory dysfunction   Edema   Anemia    Admitting Diagnosis: Urinary retention [R33 9]  Inferior pubic ramus fracture, right, closed, initial encounter ([place] [address] ) [S32 811A]  Age/Sex: 80 y o  male  Admission Orders: 10/6/2019  1802 INPATIENT     Current Facility-Administered Medications:  acetaminophen 650 mg Oral Q6H   atorvastatin 10 mg Oral Daily   cholecalciferol 1,000 Units Oral Daily   finasteride 5 mg Oral Daily   furosemide 20 mg Oral Daily   heparin (porcine) 5,000 Units Subcutaneous Q8H [address]   insulin lispro 1-5 Units Subcutaneous TID AC   insulin lispro 1-5 Units Subcutaneous HS   lisinopril 10 mg Oral Daily   tamsulosin 0 4 mg Oral Daily With Dinner       IP CONSULT TO ORTHOPEDIC SURGERY  IP CONSULT TO UROLOGY    Network Utilization Review Department  Phone: [phone]; Fax [phone]  [email or web address]  ATTENTION: Please call with any questions or concerns to [phone]  and carefully listen to the prompts so that you are directed to the right person  Send all requests for admission clinical reviews, approved or denied determinations and any other requests to fax [phone]   All voicemails are confidential

## 2019-10-07 NOTE — OCCUPATIONAL THERAPY NOTE
Occupational Therapy Evaluation      Kushmonicachorro Palaciosey    10/7/2019    Principal Problem:    Ambulatory dysfunction  Active Problems:    Edema    Hypertension    Diabetes mellitus ([facility] [address] )    Acute kidney injury superimposed on chronic kidney disease ([facility] [address] )    Urinary retention due to benign prostatic hyperplasia    Closed displaced fracture of right pubis with routine healing    Anemia      Past Medical History:   Diagnosis Date    Amblyopia     Diabetes mellitus ([facility] [address] )     Elevated PSA     Hard of hearing     Hyperlipidemia     Hypertension        Past Surgical History:   Procedure Laterality Date    HERNIA REPAIR          10/07/19 0857   Note Type   Note type Eval only   Restrictions/Precautions   Weight Bearing Precautions Per Order Yes   RLE Weight Bearing Per Order WBAT   Other Precautions Fall Risk;Pain   Pain Assessment   Pain Assessment Amezcua-Baker FACES   Amezcua-Baker FACES Pain Rating 4   Pain Location Hip   Pain Orientation Right   Hospital Pain Intervention(s) Repositioned; Ambulation/increased activity   Home Living   Type of Home Assisted living  ([facility])   Home Layout One level   [address]   Prior Function   Level of [place] Independent with ADLs and functional mobility; Needs assistance with IADLs   Lives With Friend(s)   Receives Help From Friend(s); Other (Comment)  ("Hired help" for household tasks)   ADL Assistance Independent   IADLs Needs assistance   Falls in the last 6 months 1 to 4   Vocational Retired   Lifestyle   Autonomy Pt states he resides in a community [place] with his 'brothers' in [facility]  Per the chart, one of his friends noticed he has been declining lately  Pt states he uses a RW and is independent with ADLs, but has 'hired help' for IADLs     Psychosocial   Psychosocial (WDL) WDL   ADL   Eating Assistance 7  Independent   Grooming Assistance 5  Supervision/Setup   UB Bathing Assistance 5  Supervision/Setup   LB Bathing Assistance 2  Maximal Assistance   UB Dressing Assistance 5  Supervision/Setup   LB Dressing Assistance 2  Maximal Assistance   Toileting Assistance  3  Moderate Assistance   Bed Mobility   Supine to Sit 4  Minimal assistance   Additional items Bedrails   Transfers   Sit to Stand 4  Minimal assistance   Stand to Sit 4  Minimal assistance   Additional items Assist x 2;Verbal cues   Stand pivot 4  Minimal assistance   Additional items Assist x 2;Verbal cues  (RW)   Activity Tolerance   Activity Tolerance Patient tolerated treatment well   Medical Staff Made Aware PT Mary   Nurse Made Aware RN Lizz   RUE Assessment   RUE Assessment WFL   LUE Assessment   LUE Assessment WFL   Hand Function   Gross Motor Coordination Functional   Fine Motor Coordination Functional   Cognition   Overall Cognitive Status WFL   Arousal/Participation Alert; Cooperative   Attention Within functional limits   Orientation Level Oriented X4   Memory Within functional limits   Following Commands Follows one step commands without difficulty   Assessment   Limitation Decreased ADL status; Decreased endurance;Decreased self-care trans   Prognosis Fair   Assessment Pt is a 80 y o  male seen for OT evaluation at [facility], admitted 10/6/2019 w/ Ambulatory dysfunction  OT completed extensive review of pt's medical and social history  Comorbidities affecting pt's functional performance at time of assessment include: edema, HTN, DM, urinary retention, R pubic fx, anemia  Personal factors affecting pt at time of IE include:difficulty performing ADLS and health management   Prior to admission, pt was living in a community [place] and was independent with ADL, hires help for IADLs  Upon evaluation, pt presents to OT below baseline due to the following performance deficits: weakness, decreased strength, decreased balance, decreased tolerance, impaired initiation, impaired memory, increased pain and orthopedic restrictions   Pt to benefit from continued skilled OT tx while in the hospital to address deficits as defined above and maximize level of functional independence w ADL's and functional mobility  Occupational Performance areas to address include: bathing/shower, toilet hygiene, dressing and functional mobility  Based on findings, pt is of high complexity  At this time, OT recommendations at time of discharge are short term rehab  Goals   Patient Goals Get better   Plan   Treatment Interventions ADL retraining;Functional transfer training;Patient/family training;Equipment evaluation/education; Compensatory technique education;Continued evaluation; Energy conservation   Goal Expiration Date 10/17/19   OT Frequency 3-5x/wk   Recommendation   OT Discharge Recommendation Short Term Rehab   OT - OK to Discharge Yes  (to STR when medically stable)   Barthel Index   Feeding 10   Bathing 0   Grooming Score 5   Dressing Score 5   Bladder Score 0  (catheter)   Bowels Score 10   Toilet Use Score 5   Transfers (Bed/Chair) Score 5   Mobility (Level Surface) Score 0   Stairs Score 0   Barthel Index Score 40     Pt will achieve the following goals within 10 days  *Pt will complete grooming with independence  *Pt will complete UB bathing and dressing with independence  *Pt will complete LB bathing and dressing with supervision   *Pt will complete toileting (hygiene and clothing management) with supervision    *Pt will complete bed mobility with independence, with bed flat and no side rail to prep for purposeful tasks    *Pt will perform functional transfers with supervision in order to complete ADL routine  *Pt will increase standing tolerance to 5+ minutes in order to complete sinkside ADL  *Pt will complete item retrieval with RW and supervision while demonstrating good safety  *Pt will demonstrate increased activity tolerance in order to complete ADL routine      *Pt will participate in UE therapeutic exercise in order to maximize strength for ADL transfers      [employer], OT

## 2019-10-07 NOTE — ASSESSMENT & PLAN NOTE
Chronic bilateral lower extremity pitting pedal edema, right greater than left, might be secondary to lymphatic obstruction  No record of CHF seen  Observe for now, continue with home dose lasix

## 2019-10-07 NOTE — PLAN OF CARE
Problem: Potential for Falls  Goal: Patient will remain free of falls  Description  INTERVENTIONS:  - Assess patient frequently for physical needs  -  Identify cognitive and physical deficits and behaviors that affect risk of falls    -  [place] fall precautions as indicated by assessment   - Educate patient/family on patient safety including physical limitations  - Instruct patient to call for assistance with activity based on assessment  - Modify environment to reduce risk of injury  - Consider OT/PT consult to assist with strengthening/mobility  Outcome: Progressing     Problem: Prexisting or High Potential for Compromised Skin Integrity  Goal: Skin integrity is maintained or improved  Description  INTERVENTIONS:  - Identify patients at risk for skin breakdown  - Assess and monitor skin integrity  - Assess and monitor nutrition and hydration status  - Monitor labs   - Assess for incontinence   - Turn and reposition patient  - Assist with mobility/ambulation  - Relieve pressure over bony prominences  - Avoid friction and shearing  - Provide appropriate hygiene as needed including keeping skin clean and dry  - Evaluate need for skin moisturizer/barrier cream  - Collaborate with interdisciplinary team   - Patient/family teaching  - Consider wound care consult   Outcome: Progressing     Problem: GENITOURINARY - ADULT  Goal: Maintains or returns to baseline urinary function  Description  INTERVENTIONS:  - Assess urinary function  - Encourage oral fluids to ensure adequate hydration if ordered  - Administer IV fluids as ordered to ensure adequate hydration  - Administer ordered medications as needed  - Offer frequent toileting  - Follow urinary retention protocol if ordered  Outcome: Progressing  Goal: Absence of urinary retention  Description  INTERVENTIONS:  - Assess patient's ability to void and empty bladder  - Monitor I/O  - Bladder scan as needed  - Discuss with physician/AP medications to alleviate retention as needed  - Discuss catheterization for long term situations as appropriate   Outcome: Progressing  Goal: Urinary catheter remains patent  Description  INTERVENTIONS:  - Assess patency of urinary catheter  - If patient has a chronic navarro, consider changing catheter if non-functioning  - Follow guidelines for intermittent irrigation of non-functioning urinary catheter  Outcome: Progressing     Problem: INFECTION - ADULT  Goal: Absence or prevention of progression during hospitalization  Description  INTERVENTIONS:  - Assess and monitor for signs and symptoms of infection  - Monitor lab/diagnostic results  - Monitor all insertion sites, i e  indwelling lines, tubes, and drains  - Monitor endotracheal if appropriate and nasal secretions for changes in amount and color  - [place] appropriate cooling/warming therapies per order  - Administer medications as ordered  - Instruct and encourage patient and family to use good hand hygiene technique  - Identify and instruct in appropriate isolation precautions for identified infection/condition  Outcome: Progressing  Goal: Absence of fever/infection during neutropenic period  Description  INTERVENTIONS:  - Monitor WBC    Outcome: Progressing     Problem: SAFETY ADULT  Goal: Maintain or return to baseline ADL function  Description  INTERVENTIONS:  -  Assess patient's ability to carry out ADLs; assess patient's baseline for ADL function and identify physical deficits which impact ability to perform ADLs (bathing, care of mouth/teeth, toileting, grooming, dressing, etc )  - Assess/evaluate cause of self-care deficits   - Assess range of motion  - Assess patient's mobility; develop plan if impaired  - Assess patient's need for assistive devices and provide as appropriate  - Encourage maximum independence but intervene and supervise when necessary  - Involve family in performance of ADLs  - Assess for home care needs following discharge   - Consider OT consult to assist with ADL evaluation and planning for discharge  - Provide patient education as appropriate  Outcome: Progressing  Goal: Maintain or return mobility status to optimal level  Description  INTERVENTIONS:  - Assess patient's baseline mobility status (ambulation, transfers, stairs, etc )    - Identify cognitive and physical deficits and behaviors that affect mobility  - Identify mobility aids required to assist with transfers and/or ambulation (gait belt, sit-to-stand, lift, walker, cane, etc )  - [place] fall precautions as indicated by assessment  - Record patient progress and toleration of activity level on Mobility SBAR; progress patient to next Phase/Stage  - Instruct patient to call for assistance with activity based on assessment  - Consider rehabilitation consult to assist with strengthening/weightbearing, etc   Outcome: Progressing     Problem: DISCHARGE PLANNING  Goal: Discharge to home or other facility with appropriate resources  Description  INTERVENTIONS:  - Identify barriers to discharge w/patient and caregiver  - Arrange for needed discharge resources and transportation as appropriate  - Identify discharge learning needs (meds, wound care, etc )  - Arrange for interpretive services to assist at discharge as needed  - Refer to Case Management Department for coordinating discharge planning if the patient needs post-hospital services based on physician/advanced practitioner order or complex needs related to functional status, cognitive ability, or social support system  Outcome: Progressing     Problem: Knowledge Deficit  Goal: Patient/family/caregiver demonstrates understanding of disease process, treatment plan, medications, and discharge instructions  Description  Complete learning assessment and assess knowledge base    Interventions:  - Provide teaching at level of understanding  - Provide teaching via preferred learning methods  Outcome: Progressing

## 2019-10-07 NOTE — ASSESSMENT & PLAN NOTE
Lab Results   Component Value Date    HGBA1C 6 7 (H) 10/07/2019     History of diabetes on metformin, glipizide, repaglinide and Canaglifozin    A1c 6 7  Continue with SSI, and Accucheks

## 2019-10-07 NOTE — PLAN OF CARE
Problem: OCCUPATIONAL THERAPY ADULT  Goal: Performs self-care activities at highest level of function for planned discharge setting  See evaluation for individualized goals  Outcome: Progressing  Note:   Limitation: Decreased ADL status, Decreased endurance, Decreased self-care trans  Prognosis: Fair  Assessment: Pt is a 80 y o  male seen for OT evaluation at [facility], admitted 10/6/2019 w/ Ambulatory dysfunction  OT completed extensive review of pt's medical and social history  Comorbidities affecting pt's functional performance at time of assessment include: edema, HTN, DM, urinary retention, R pubic fx, anemia  Personal factors affecting pt at time of IE include:difficulty performing ADLS and health management   Prior to admission, pt was living in a [place] and was independent with ADL, hires help for IADLs  Upon evaluation, pt presents to OT below baseline due to the following performance deficits: weakness, decreased strength, decreased balance, decreased tolerance, impaired initiation, impaired memory, increased pain and orthopedic restrictions  Pt to benefit from continued skilled OT tx while in the hospital to address deficits as defined above and maximize level of functional independence w ADL's and functional mobility  Occupational Performance areas to address include: bathing/shower, toilet hygiene, dressing and functional mobility  Based on findings, pt is of high complexity  At this time, OT recommendations at time of discharge are short term rehab       OT Discharge Recommendation: Short Term Rehab  OT - OK to Discharge: Yes(to STR when medically stable)

## 2019-10-07 NOTE — DISCHARGE INSTRUCTIONS
Navarro Cath Care instructions---Maintain navarro catheter to straight drainage  May use leg bag and shower  May flush TID prn using Dilip syringe and 120 ml NSS  May use more saline ad hilary to prevent/treat cath obstruction  Remove catheter on 8th day rehab by 0600 hrs  Bladder scan if no void or less than 200ml in 6hrs  Straight cath for bladder scan >350ml and repeat process  If patient requires straight cath x3 , re-insert navarro and call for Urologist follow-up  Navarro can remain in place for up to 4 weeks at a time  Navarro placed at [facility] on 10/6/19    Discharge Instructions - Orthopedics  Aicha Franco 80 y o  male MRN: [id number]  Unit/Bed#: QU XRAY    Weight Bearing Status:                                           WBAT to RLE with assistance    Pain:  Continue analgesics as directed    PT/OT:  Continue PT/OT on outpatient basis as directed    Appt Instructions: If you do not have your appointment, please call the clinic at [phone] to f/u with Dr Raina Stapleton in 6 weeks  Otherwise followup as scheduled     Contact the office sooner if you experience any increased numbness/tingling in the extremities

## 2019-10-07 NOTE — SOCIAL WORK
LOS: 1  GMLOS: 2 9  PATIENT IS NOT A MEDICARE BUNDLE OR A 30 DAY READMISSION  Met with patient  Explained role of care management  Patient lives in a room at [facility]  His room is on the first floor  He is independent adl's, ambulates with a RW, drives, goes to DR for meals  His medications are provided at [facility]  There is a nurse there as per patient  DME - RW, cane  Past services - denies MH, D&A or admission to SNF  He states that he does not have a POA, but does have an AD  PT recommending short term skilled rehab  Patient is agreeable but uncertain as to where he would like to go  He directed me to call either Anthony Kessler or Rosanna Jha  Called and spoke with Rosanna Jha re: discharge plan and he will call back

## 2019-10-07 NOTE — ASSESSMENT & PLAN NOTE
Patient presenting with ambulatory dysfunction, found to have a right pelvic fracture    Plan  · WBAT  · Pain control with tylenol   · Lidocaine patch  · PT/OT  · CM consult  · Ortho consulted, await recs

## 2019-10-07 NOTE — ASSESSMENT & PLAN NOTE
Patient with history of chronic kidney disease, on admission, creatinine was elevated to 1 61, but GFR stayed the same  · Baseline creatinine 1 59  · Hydrate and trend BMP daily

## 2019-10-07 NOTE — SOCIAL WORK
Received call from patients Xi SALGADO  He is agreeable to skilled rehab and would prefer a [place] be [place] [facility] [place]  [place]erral via [facility] [place] [facility] [place]  Await bed availability

## 2019-10-07 NOTE — CONSULTS
Consultation - Urology   Melany Hurley 80 y o  male MRN: [id number]  Unit/Bed#: [address] [place] [address] 210-02 Encounter: [id number]      Assessment/Plan      Acute urinary retention  With history of BPH  Navarro catheter in place  On home Flomax and Proscar - continue  Will likely go home with navarro and can have void trial at rehab facility    Ambulatory dysfunction  Secondary to pelvic fracture  Contributing to urinary retention    Pelvic fracture  Ortho on board  Weight bearing as tolerating    History of Present Illness   Attending: Sandy Martin MD  Reason for Consult / Principal Problem: Urinary retention    HPI: Melany Hurley is a 80y o  year old male who presents with ambulatory dysfunction  He comes from home where his friend noticed he had not been himself over the past few days and yesterday noticed the patient was having trouble walking and was not urinating  In the Er he was found to be in acute urinary retention and ct scan showed fracture of the right inferior pubic ramus as well as enlarged prostate gland  Inpatient consult to Urology  Consult performed by: Jim Sanford PA-C  Consult ordered by: Sandy Martin MD          Review of Systems   Unable to perform ROS: Dementia       Historical Information   Past Medical History:   Diagnosis Date    Amblyopia     Diabetes mellitus ([facility] [address] )     Elevated PSA     Hard of hearing     Hyperlipidemia     Hypertension      Past Surgical History:   Procedure Laterality Date    HERNIA REPAIR       Social History   Social History     Substance and Sexual Activity   Alcohol Use Yes    Comment: manhattens for "big occasions"      Social History     Substance and Sexual Activity   Drug Use No     Social History     Tobacco Use   Smoking Status Never Smoker   Smokeless Tobacco Never Used     Family History: non-contributory    Meds/Allergies   PTA meds:   Prior to Admission Medications   Prescriptions Last Dose Informant Patient Reported? Taking? Cholecalciferol (VITAMIN D) 2000 UNITS CAPS   Yes No   Sig: Take 2,000 Units by mouth daily with breakfast   atorvastatin (LIPITOR) 10 mg tablet   Yes No   Sig: Take 10 mg by mouth daily   canagliflozin (INVOKANA) 100 mg   Yes No   Sig: Take 300 mg by mouth daily before breakfast     finasteride (PROSCAR) 5 mg tablet   Yes No   Sig: Take 5 mg by mouth daily   furosemide (LASIX) 20 mg tablet   No No   Sig: Take 1 tablet by mouth daily for 30 days   Patient taking differently: Take 20 mg by mouth every other day     glipiZIDE (GLUCOTROL) 10 mg tablet   Yes No   Sig: Take 10 mg by mouth 2 (two) times a day before meals   metFORMIN (GLUCOPHAGE) 500 mg tablet   Yes No   Sig: Take 1,000 mg by mouth 2 (two) times a day with meals     metFORMIN (GLUCOPHAGE) 500 mg tablet   Yes No   Sig: Take 500 mg by mouth daily with lunch   quinapril (ACCUPRIL) 20 mg tablet   Yes No   Sig: Take 20 mg by mouth 2 (two) times a day   repaglinide (PRANDIN) 2 mg tablet   Yes No   Sig: Take 2 mg by mouth 3 (three) times a day before meals     tamsulosin (FLOMAX) 0 4 mg   Yes No   Sig: Take 0 4 mg by mouth 2 (two) times a day        Facility-Administered Medications: None     No Known Allergies    Objective   Vitals: Blood pressure 128/59, pulse 60, temperature 98 °F (36 7 °C), temperature source Oral, resp  rate 16, height 5' 6" (1 676 m), weight 53 6 kg (118 lb 2 7 oz), SpO2 97 %  I/O last 24 hours: In: -   Out: 1100 [Urine:1100]    Invasive Devices     Peripheral Intravenous Line            Peripheral IV 01/14/19 Right Antecubital 266 days    Peripheral IV 01/14/19 Right Antecubital 265 days    Peripheral IV 10/06/19 Left Antecubital less than 1 day          Drain            Urethral Catheter Double-lumen 16 Fr  less than 1 day                Physical Exam   Constitutional: He is oriented to person, place, and time  He appears well-developed and well-nourished  No distress  HENT:   Head: Normocephalic and atraumatic     Eyes: Pupils are equal, round, and reactive to light  EOM are normal    Neck: Normal range of motion  Neck supple  Cardiovascular: Normal rate, regular rhythm and normal heart sounds  Exam reveals no gallop and no friction rub  No murmur heard  Pulmonary/Chest: Effort normal  He has no wheezes  He has no rales  Abdominal: Soft  Bowel sounds are normal  He exhibits no distension and no mass  There is no tenderness  Neurological: He is alert and oriented to person, place, and time  Skin: Skin is warm and dry  Psychiatric: He exhibits abnormal recent memory and abnormal remote memory  Lab Results:   CBC:   Lab Results   Component Value Date    WBC 5 98 10/07/2019    HGB 11 4 (L) 10/07/2019    HCT 34 5 (L) 10/07/2019    MCV 95 10/07/2019     10/07/2019    MCH 31 2 10/07/2019    MCHC 33 0 10/07/2019    RDW 14 6 10/07/2019    MPV 9 6 10/07/2019     CMP:   Lab Results   Component Value Date    SODIUM 141 10/07/2019     10/07/2019    CO2 25 10/07/2019    BUN 37 (H) 10/07/2019    CREATININE 1 45 (H) 10/07/2019    CALCIUM 9 3 10/07/2019    AST 21 10/06/2019    ALT 22 10/06/2019    ALKPHOS 100 10/06/2019    EGFR 42 10/07/2019     Urinalysis:   Lab Results   Component Value Date    COLORU Yellow 10/06/2019    CLARITYU Clear 10/06/2019    SPECGRAV 1 010 10/06/2019    PHUR 6 0 10/06/2019    LEUKOCYTESUR (A) 10/06/2019     Elevated glucose may cause decreased leukocyte values  See urine microscopic for [facility] result/    NITRITE Negative 10/06/2019    GLUCOSEU >=1000 (1%) (A) 10/06/2019    KETONESU Negative 10/06/2019    BILIRUBINUR Negative 10/06/2019    BLOODU Negative 10/06/2019     Imaging Studies: I have personally reviewed pertinent reports  EKG, Pathology, and Other Studies: I have personally reviewed pertinent reports      VTE Prophylaxis: Heparin    Code Status: Level 1 - Full Code  Advance Directive and Living Will: Yes    Power of : Yes

## 2019-10-07 NOTE — PLAN OF CARE
Problem: PHYSICAL THERAPY ADULT  Goal: Performs mobility at highest level of function for planned discharge setting  See evaluation for individualized goals  Description  Treatment/Interventions: ADL retraining, Functional transfer training, LE strengthening/ROM, Therapeutic exercise, Endurance training, Patient/family training, Equipment eval/education, Bed mobility, Gait training, Spoke to nursing, Spoke to case management, OT  Equipment Recommended: Stanislav Elizabeth       See flowsheet documentation for full assessment, interventions and recommendations  Outcome: Progressing  Note:   Prognosis: Good  Problem List: Decreased strength, Decreased endurance, Impaired balance, Decreased mobility, Decreased coordination, Orthopedic restrictions, Pain  Assessment: Pt presents s/p fall with Rpubic ramus fx wbat  REquires assist to mobilize oob and few steps to chair with rw  Displays impaired strength, transfers, balance and gait   Can benefit from skilled PT services to regian safe functional mobility  REcommend shor tterm in-pt rehab atd/c to regain sfae ind functional mobility  Will follow see goals        Recommendation: Short-term skilled PT     PT - OK to Discharge: Yes    See flowsheet documentation for full assessment

## 2019-10-07 NOTE — TELEPHONE ENCOUNTER
Patient seen today in the hospital for urinary retention  He will need an outpatient void trial   This can be done with our nursing staff  If he fails this he should follow up with me to discuss further treatment options

## 2019-10-07 NOTE — ASSESSMENT & PLAN NOTE
Normocytic normochromic anemia, Hb 11 8, baseline 11 2 -12 7  -follow Iron panel, B12 and folate results  - Keep Hb > 7

## 2019-10-07 NOTE — ASSESSMENT & PLAN NOTE
History of hypertension on Lasix and quinapril at home    Quinapril switched to lisinopril  Monitor blood pressure per unit protocol

## 2019-10-07 NOTE — PHYSICAL THERAPY NOTE
PT eval   10/07/19 0855   Note Type   Note type Eval only   Pain Assessment   Pain Assessment Amezcua-Baker FACES   Amezcua-Baker FACES Pain Rating 4   Pain Location Pelvis; Hip   Pain Orientation Right   Home Living   Type of Home House   Prior Function   Level of [place] Independent with ADLs and functional mobility; Needs assistance with IADLs   Lives With Family  (brothers ?[place])   Vocational Retired   Comments uses  PTA   Restrictions/Precautions   [place] Bearing Precautions Per Order Yes   RLE [place] Bearing Per Order WBAT   Other Precautions Fall Risk;Pain   General   Additional Pertinent History has navarro  had retention, fell sustained r pubic ramus fx   Family/Caregiver Present No   Cognition   Arousal/Participation Alert   Orientation Level Oriented to person;Oriented to place   Memory Decreased recall of precautions   Following Commands Follows one step commands with increased time or repetition   RUE Assessment   RUE Assessment WFL   LUE Assessment   LUE Assessment WFL   RLE Assessment   RLE Assessment   (movement painful b hamstrings tight,noresistive testing)   LLE Assessment   LLE Assessment WFL  (strength grossly 4-5)   Coordination   Movements are Fluid and Coordinated 0   Coordination and Movement Description bradykinetic   Proprioception   RLE Proprioception Grossly intact   LLE Proprioception Grossly Intact   Bed Mobility   Rolling R 3  Moderate assistance   Additional items Assist x 2   Supine to Sit 4  Minimal assistance   Additional items Assist x 2;Bedrails; Increased time required;Verbal cues;LE management   Transfers   Sit to Stand 4  Minimal assistance   Additional items Assist x 2;Bedrails;Armrests; Increased time required   Stand to Sit 4  Minimal assistance   Additional items Assist x 2; Increased time required;Verbal cues;Armrests   Stand pivot 3  Moderate assistance   Additional items Assist x 2; Increased time required;Verbal cues;Armrests  (rw pwb Rle)   Ambulation/Elevation Gait pattern Short stride; Step to;Excessively slow; Shuffling; Inconsistent nithin; Improper Weight shift; Forward Flexion;Narrow JAMES   Gait Assistance 3  Moderate assist   Additional items Assist x 2   Assistive Device Rolling walker   Distance 5'   Balance   Static Sitting Fair -   Dynamic Sitting Fair -   Static Standing Fair -   Dynamic Standing Poor +   Ambulatory Poor +   Endurance Deficit   Endurance Deficit Yes   Endurance Deficit Description tires quickly   Activity Tolerance   Activity Tolerance Patient limited by fatigue;Patient limited by pain   Medical Staff Made Aware OT Michelle   Assessment   Prognosis Good   Problem List Decreased strength;Decreased endurance; Impaired balance;Decreased mobility; Decreased coordination;Orthopedic restrictions;Pain   Assessment Pt presents s/p fall with Rpubic ramus fx wbat  REquires assist to mobilize oob and few steps to chair with rw  Displays impaired strength, transfers, balance and gait   Can benefit from skilled PT services to regian safe functional mobility  REcommend shor tterm in-pt rehab atd/c to regain sfae ind functional mobility  Will follow see goals   Goals   Patient Goals get better   STG Expiration Date 10/17/19   Short Term Goal #1 1)safe ind transfers with rw wbat Rle 2)improve strength 1/2 grade Rle 3) safe amb with RW 50' level wbat Rle   Plan   Treatment/Interventions ADL retraining;Functional transfer training;LE strengthening/ROM; Therapeutic exercise; Endurance training;Patient/family training;Equipment eval/education; Bed mobility;Gait training;Spoke to nursing;Spoke to case management;OT   PT Frequency 5x/wk   Recommendation   Recommendation Short-term skilled PT   Equipment Recommended Walker   PT - OK to Discharge Yes   Additional Comments   (to STR)   Barthel Index   Feeding 10   Bathing 0   Grooming Score 5   Dressing Score 5   Bladder Score 0   Bowels Score 10   Toilet Use Score 5   Transfers (Bed/Chair) Score 5   Mobility (Level Surface) Score 0   Stairs Score 0   Barthel Index Score 40   Asa Mitten, PT

## 2019-10-08 LAB
ANION GAP SERPL CALCULATED.3IONS-SCNC: 12 MMOL/L (ref 4–13)
BUN SERPL-MCNC: 41 MG/DL (ref 5–25)
CALCIUM SERPL-MCNC: 9.5 MG/DL (ref 8.3–10.1)
CHLORIDE SERPL-SCNC: 103 MMOL/L (ref 100–108)
CO2 SERPL-SCNC: 25 MMOL/L (ref 21–32)
CREAT SERPL-MCNC: 1.47 MG/DL (ref 0.6–1.3)
ERYTHROCYTE [DISTWIDTH] IN BLOOD BY AUTOMATED COUNT: 14.8 % (ref 11.6–15.1)
GFR SERPL CREATININE-BSD FRML MDRD: 41 ML/MIN/1.73SQ M
GLUCOSE SERPL-MCNC: 143 MG/DL (ref 65–140)
GLUCOSE SERPL-MCNC: 160 MG/DL (ref 65–140)
GLUCOSE SERPL-MCNC: 210 MG/DL (ref 65–140)
GLUCOSE SERPL-MCNC: 231 MG/DL (ref 65–140)
GLUCOSE SERPL-MCNC: 245 MG/DL (ref 65–140)
HCT VFR BLD AUTO: 35 % (ref 36.5–49.3)
HGB BLD-MCNC: 11.4 G/DL (ref 12–17)
MCH RBC QN AUTO: 31.2 PG (ref 26.8–34.3)
MCHC RBC AUTO-ENTMCNC: 32.6 G/DL (ref 31.4–37.4)
MCV RBC AUTO: 96 FL (ref 82–98)
PLATELET # BLD AUTO: 185 THOUSANDS/UL (ref 149–390)
PMV BLD AUTO: 10 FL (ref 8.9–12.7)
POTASSIUM SERPL-SCNC: 3.6 MMOL/L (ref 3.5–5.3)
RBC # BLD AUTO: 3.65 MILLION/UL (ref 3.88–5.62)
SODIUM SERPL-SCNC: 140 MMOL/L (ref 136–145)
WBC # BLD AUTO: 7.71 THOUSAND/UL (ref 4.31–10.16)

## 2019-10-08 PROCEDURE — 97530 THERAPEUTIC ACTIVITIES: CPT

## 2019-10-08 PROCEDURE — 80048 BASIC METABOLIC PNL TOTAL CA: CPT | Performed by: INTERNAL MEDICINE

## 2019-10-08 PROCEDURE — 82948 REAGENT STRIP/BLOOD GLUCOSE: CPT

## 2019-10-08 PROCEDURE — 85027 COMPLETE CBC AUTOMATED: CPT | Performed by: INTERNAL MEDICINE

## 2019-10-08 PROCEDURE — 99232 SBSQ HOSP IP/OBS MODERATE 35: CPT | Performed by: INTERNAL MEDICINE

## 2019-10-08 RX ADMIN — ATORVASTATIN CALCIUM 10 MG: 10 TABLET, FILM COATED ORAL at 08:52

## 2019-10-08 RX ADMIN — TAMSULOSIN HYDROCHLORIDE 0.4 MG: 0.4 CAPSULE ORAL at 18:18

## 2019-10-08 RX ADMIN — HEPARIN SODIUM 5000 UNITS: 5000 INJECTION INTRAVENOUS; SUBCUTANEOUS at 06:02

## 2019-10-08 RX ADMIN — INSULIN LISPRO 2 UNITS: 100 INJECTION, SOLUTION INTRAVENOUS; SUBCUTANEOUS at 12:22

## 2019-10-08 RX ADMIN — ACETAMINOPHEN 650 MG: 325 TABLET, FILM COATED ORAL at 18:18

## 2019-10-08 RX ADMIN — INSULIN LISPRO 2 UNITS: 100 INJECTION, SOLUTION INTRAVENOUS; SUBCUTANEOUS at 18:18

## 2019-10-08 RX ADMIN — INSULIN LISPRO 2 UNITS: 100 INJECTION, SOLUTION INTRAVENOUS; SUBCUTANEOUS at 21:41

## 2019-10-08 RX ADMIN — LISINOPRIL 10 MG: 10 TABLET ORAL at 08:52

## 2019-10-08 RX ADMIN — HEPARIN SODIUM 5000 UNITS: 5000 INJECTION INTRAVENOUS; SUBCUTANEOUS at 13:27

## 2019-10-08 RX ADMIN — ACETAMINOPHEN 650 MG: 325 TABLET, FILM COATED ORAL at 08:52

## 2019-10-08 RX ADMIN — ACETAMINOPHEN 650 MG: 325 TABLET, FILM COATED ORAL at 13:26

## 2019-10-08 RX ADMIN — ACETAMINOPHEN 650 MG: 325 TABLET, FILM COATED ORAL at 02:00

## 2019-10-08 RX ADMIN — FINASTERIDE 5 MG: 5 TABLET, FILM COATED ORAL at 08:52

## 2019-10-08 RX ADMIN — HEPARIN SODIUM 5000 UNITS: 5000 INJECTION INTRAVENOUS; SUBCUTANEOUS at 21:41

## 2019-10-08 RX ADMIN — TAMSULOSIN HYDROCHLORIDE 0.4 MG: 0.4 CAPSULE ORAL at 08:53

## 2019-10-08 RX ADMIN — MELATONIN 1000 UNITS: at 08:52

## 2019-10-08 RX ADMIN — FUROSEMIDE 20 MG: 20 TABLET ORAL at 08:53

## 2019-10-08 NOTE — PLAN OF CARE
Problem: Potential for Falls  Goal: Patient will remain free of falls  Description  INTERVENTIONS:  - Assess patient frequently for physical needs  -  Identify cognitive and physical deficits and behaviors that affect risk of falls    -  [place] fall precautions as indicated by assessment   - Educate patient/family on patient safety including physical limitations  - Instruct patient to call for assistance with activity based on assessment  - Modify environment to reduce risk of injury  - Consider OT/PT consult to assist with strengthening/mobility  Outcome: Progressing     Problem: Prexisting or High Potential for Compromised Skin Integrity  Goal: Skin integrity is maintained or improved  Description  INTERVENTIONS:  - Identify patients at risk for skin breakdown  - Assess and monitor skin integrity  - Assess and monitor nutrition and hydration status  - Monitor labs   - Assess for incontinence   - Turn and reposition patient  - Assist with mobility/ambulation  - Relieve pressure over bony prominences  - Avoid friction and shearing  - Provide appropriate hygiene as needed including keeping skin clean and dry  - Evaluate need for skin moisturizer/barrier cream  - Collaborate with interdisciplinary team   - Patient/family teaching  - Consider wound care consult   Outcome: Progressing     Problem: GENITOURINARY - ADULT  Goal: Maintains or returns to baseline urinary function  Description  INTERVENTIONS:  - Assess urinary function  - Encourage oral fluids to ensure adequate hydration if ordered  - Administer IV fluids as ordered to ensure adequate hydration  - Administer ordered medications as needed  - Offer frequent toileting  - Follow urinary retention protocol if ordered  Outcome: Progressing  Goal: Absence of urinary retention  Description  INTERVENTIONS:  - Assess patient's ability to void and empty bladder  - Monitor I/O  - Bladder scan as needed  - Discuss with physician/AP medications to alleviate retention as needed  - Discuss catheterization for long term situations as appropriate   Outcome: Progressing  Goal: Urinary catheter remains patent  Description  INTERVENTIONS:  - Assess patency of urinary catheter  - If patient has a chronic navarro, consider changing catheter if non-functioning  - Follow guidelines for intermittent irrigation of non-functioning urinary catheter  Outcome: Progressing     Problem: INFECTION - ADULT  Goal: Absence or prevention of progression during hospitalization  Description  INTERVENTIONS:  - Assess and monitor for signs and symptoms of infection  - Monitor lab/diagnostic results  - Monitor all insertion sites, i e  indwelling lines, tubes, and drains  - Monitor endotracheal if appropriate and nasal secretions for changes in amount and color  - [place] appropriate cooling/warming therapies per order  - Administer medications as ordered  - Instruct and encourage patient and family to use good hand hygiene technique  - Identify and instruct in appropriate isolation precautions for identified infection/condition  Outcome: Progressing  Goal: Absence of fever/infection during neutropenic period  Description  INTERVENTIONS:  - Monitor WBC    Outcome: Progressing     Problem: SAFETY ADULT  Goal: Maintain or return to baseline ADL function  Description  INTERVENTIONS:  -  Assess patient's ability to carry out ADLs; assess patient's baseline for ADL function and identify physical deficits which impact ability to perform ADLs (bathing, care of mouth/teeth, toileting, grooming, dressing, etc )  - Assess/evaluate cause of self-care deficits   - Assess range of motion  - Assess patient's mobility; develop plan if impaired  - Assess patient's need for assistive devices and provide as appropriate  - Encourage maximum independence but intervene and supervise when necessary  - Involve family in performance of ADLs  - Assess for home care needs following discharge   - Consider OT consult to assist with ADL evaluation and planning for discharge  - Provide patient education as appropriate  Outcome: Progressing  Goal: Maintain or return mobility status to optimal level  Description  INTERVENTIONS:  - Assess patient's baseline mobility status (ambulation, transfers, stairs, etc )    - Identify cognitive and physical deficits and behaviors that affect mobility  - Identify mobility aids required to assist with transfers and/or ambulation (gait belt, sit-to-stand, lift, walker, cane, etc )  - [place] fall precautions as indicated by assessment  - Record patient progress and toleration of activity level on Mobility SBAR; progress patient to next Phase/Stage  - Instruct patient to call for assistance with activity based on assessment  - Consider rehabilitation consult to assist with strengthening/weightbearing, etc   Outcome: Progressing     Problem: DISCHARGE PLANNING  Goal: Discharge to home or other facility with appropriate resources  Description  INTERVENTIONS:  - Identify barriers to discharge w/patient and caregiver  - Arrange for needed discharge resources and transportation as appropriate  - Identify discharge learning needs (meds, wound care, etc )  - Arrange for interpretive services to assist at discharge as needed  - Refer to Case Management Department for coordinating discharge planning if the patient needs post-hospital services based on physician/advanced practitioner order or complex needs related to functional status, cognitive ability, or social support system  Outcome: Progressing     Problem: Knowledge Deficit  Goal: Patient/family/caregiver demonstrates understanding of disease process, treatment plan, medications, and discharge instructions  Description  Complete learning assessment and assess knowledge base    Interventions:  - Provide teaching at level of understanding  - Provide teaching via preferred learning methods  Outcome: Progressing

## 2019-10-08 NOTE — PLAN OF CARE
Problem: PHYSICAL THERAPY ADULT  Goal: Performs mobility at highest level of function for planned discharge setting  See evaluation for individualized goals  Description  Treatment/Interventions: ADL retraining, Functional transfer training, LE strengthening/ROM, Therapeutic exercise, Endurance training, Patient/family training, Equipment eval/education, Bed mobility, Gait training, Spoke to nursing, Spoke to case management, OT  Equipment Recommended: Sonal Mcclain       See flowsheet documentation for full assessment, interventions and recommendations  Outcome: Progressing  Note:   Prognosis: Good  Problem List: Decreased strength, Decreased endurance, Impaired balance, Decreased mobility, Decreased cognition, Decreased safety awareness, Decreased skin integrity  Assessment: Pt able to transfer btb with rw and Ax2 requesting pain puill and RN informed  NEed in reach sccds reapplied  Barriers to Discharge: Inaccessible home environment, Decreased caregiver support     Recommendation: Short-term skilled PT     PT - OK to Discharge: Yes    See flowsheet documentation for full assessment

## 2019-10-08 NOTE — PROGRESS NOTES
Pastoral Care Progress Note    10/8/2019  Patient: Tyler Peñaloza : 1927  Admission Date & Time: 10/6/2019 1502  MRN: [id number] [facility]: [id number]                     Chaplaincy Interventions Utilized:   Empowerment: Provided chaplaincy education    Exploration: Explored emotional needs & resources, Explored spiritual needs & resources and Facilitated story telling        Relationship Building: Cultivated a relationship of care and support, Listened empathically, Hospitality and Provided silent and supportive presence                Chaplaincy Outcomes Achieved:  Expressed gratitude, Expressed ultimate hope and Relational resources utilized    Pleasant, receptive man   for 65 years in Adventism order [facility] located at [place]  Well supported by his Adventism community  Shared in depth  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[facility]  [employer]  Spiritual Coping Strategies Utilized:   Connectedness, Spiritual gratitude and Spiritual community       10/08/19 TOYIN Pereira 39 Involvement Patient active with Sabianist;Religious active in support   Spiritual Leader   [facility]   [address] Aware/Contacted Leader/Sabianist aware of patient's status   Spiritual Beliefs/Perceptions   Concept of God Accepting   Relationship with God Close   Spiritual Strengths   (Life long Mormonism radha   Close community belonging )   Support Systems Religious/radha community   Stress Factors   Patient Stress Factors None identified   Coping Responses   Patient Coping Accepting;Open/discussion   Plan of Care   Assessment Completed by: Unit visit

## 2019-10-08 NOTE — SOCIAL WORK
Bed is available at [facility] [place]  Called and notified Kelly Beard,  of bed availability

## 2019-10-08 NOTE — ASSESSMENT & PLAN NOTE
Chronic bilateral lower extremity pitting pedal edema, right greater than left, might be secondary to lymphatic obstruction  · Resolving, left leg 1+, right leg 2+  · Observe for now, continue with home dose lasix

## 2019-10-08 NOTE — ASSESSMENT & PLAN NOTE
Normocytic normochromic anemia, Hb 11 8, baseline 11 2 -12 7  · Iron levels low, ferritin, folate and B12 levels within normal limts, suggests anemia of chronic kidney disease  · No intervention currently, might EPO eventually  · Keep Hb > 7

## 2019-10-08 NOTE — PROGRESS NOTES
Progress Note - Eloy Pitch 9/14/1927, 80 y o  male MRN: [id number]    Unit/Bed#: [address] [place] [address] Encounter: [id number]    Primary Care Provider: Nishi Roldan MD   Date and time admitted to hospital: 10/6/2019  3:02 PM        * Ambulatory dysfunction  Assessment & Plan  Patient presenting with ambulatory dysfunction, found to have a right pelvic fracture      Plan  · WBAT  · Plan for discharge tomorrow  · Pain control with tylenol and lidocaine patch  · PT/OT  · CM consult  · Ortho consulted, appreciate recs    Urinary retention due to benign prostatic hyperplasia  Assessment & Plan  Patient presenting with acute urinary retention, most likely secondary to BPH, status post Tovar catheter placement in the ER  · Continue with home medications Flomax and finasteride  · Per Urology, okay to discharge with Tovar    Closed displaced fracture of right pubis with routine healing  Assessment & Plan  Incidental finding of a closed nondisplaced fracture of the right pubic rami on CT scan pelvis, unable to determine aetiology of fracture  Patient is a poor historian and does not recollect any recent falls, although he does have scattered mild bruises on his legs        Plan  - WBAT for now  - Pt/OT  - DVT prophylaxis  - Pain control  - Ortho on board      Anemia  Assessment & Plan  Normocytic normochromic anemia, Hb 11 8, baseline 11 2 -12 7  · Iron levels low, ferritin, folate and B12 levels within normal limts, suggests anemia of chronic kidney disease  · No intervention currently, might EPO eventually  · Keep Hb > 7    Chronic kidney disease (CKD), active medical management without dialysis, stage 3 (moderate) ([address] )  Assessment & Plan  Patient with history of chronic kidney disease, on admission, creatinine was elevated to 1 61, but GFR stayed the same  · Baseline creatinine 1 59  · Hydrate and trend BMP daily    Diabetes mellitus ([address] )  Assessment & Plan    Lab Results   Component Value Date    HGBA1C 6 7 (H) 10/07/2019     History of diabetes on metformin, glipizide, repaglinide and Canaglifozin  A1c 6 7  Continue with SSI, and Accucheks      Hypertension  Assessment & Plan  History of hypertension on Lasix and quinapril at home  Quinapril switched to lisinopril  Blood pressure stable on lisinopril and Lasix  Monitor blood pressure per unit protocol    Edema  Assessment & Plan  Chronic bilateral lower extremity pitting pedal edema, right greater than left, might be secondary to lymphatic obstruction  · Resolving, left leg 1+, right leg 2+  · Observe for now, continue with home dose lasix    VTE Pharmacologic Prophylaxis:   Pharmacologic: Heparin  Mechanical VTE Prophylaxis in Place: Yes    Patient Centered Rounds: I have performed bedside rounds with nursing staff today  Discussions with Specialists or Other Care Team Provider:  Orthopedics on board    Education and Discussions with Family / Patient:  Discussed with patient and friend Rhina Carrera    Time Spent for Care: 30 minutes  More than 50% of total time spent on counseling and coordination of care as described above  Current Length of Stay: 2 day(s)    Current Patient Status: Inpatient   Certification Statement: The patient will continue to require additional inpatient hospital stay due to Right pelvic fracture    Discharge Plan:  Tomorrow    Code Status: Level 1 - Full Code      Subjective:   No overnight events in good spirits no complaints    Objective:     Vitals:   Temp (24hrs), Av 1 °F (36 7 °C), Min:97 7 °F (36 5 °C), Max:98 9 °F (37 2 °C)    Temp:  [97 7 °F (36 5 °C)-98 9 °F (37 2 °C)] 98 9 °F (37 2 °C)  HR:  [73-80] 73  Resp:  [16-18] 17  BP: (118-129)/(58-62) 118/58  SpO2:  [96 %-98 %] 98 %  Body mass index is 19 07 kg/m²  Input and Output Summary (last 24 hours):        Intake/Output Summary (Last 24 hours) at 10/8/2019 1443  Last data filed at 10/8/2019 0201  Gross per 24 hour   Intake    Output 1350 ml   Net -1350 ml       Physical Exam:     Physical Exam   Cardiovascular: Normal rate and regular rhythm  Pulmonary/Chest: Breath sounds normal  No stridor  No respiratory distress  Abdominal: Soft  Bowel sounds are normal  He exhibits no distension  Musculoskeletal: He exhibits edema (right leg 2+ve pitting edema, left leg 1+)  Neurological: He is alert  Additional Data:     Labs:    Results from last 7 days   Lab Units 10/08/19  0557  10/06/19  1527   WBC Thousand/uL 7 71   < > 6 57   HEMOGLOBIN g/dL 11 4*   < > 11 8*   HEMATOCRIT % 35 0*   < > 36 0*   PLATELETS Thousands/uL 185   < > 184   NEUTROS PCT %  --   --  70   LYMPHS PCT %  --   --  16   MONOS PCT %  --   --  13*   EOS PCT %  --   --  1    < > = values in this interval not displayed  Results from last 7 days   Lab Units 10/08/19  0557  10/06/19  1527   SODIUM mmol/L 140   < > 139   POTASSIUM mmol/L 3 6   < > 4 2   CHLORIDE mmol/L 103   < > 102   CO2 mmol/L 25   < > 29   BUN mg/dL 41*   < > 41*   CREATININE mg/dL 1 47*   < > 1 61*   ANION GAP mmol/L 12   < > 8   CALCIUM mg/dL 9 5   < > 9 7   ALBUMIN g/dL  --   --  3 6   TOTAL BILIRUBIN mg/dL  --   --  0 40   ALK PHOS U/L  --   --  100   ALT U/L  --   --  22   AST U/L  --   --  21   GLUCOSE RANDOM mg/dL 160*   < > 111    < > = values in this interval not displayed  Results from last 7 days   Lab Units 10/08/19  1113 10/08/19  0736 10/07/19  2118 10/07/19  1602 10/07/19  1107 10/07/19  0733 10/06/19  2131   POC GLUCOSE mg/dl 210* 143* 160* 171* 345* 86 158*     Results from last 7 days   Lab Units 10/07/19  0504   HEMOGLOBIN A1C % 6 7*               * I Have Reviewed All Lab Data Listed Above  * Additional Pertinent Lab Tests Reviewed:  All Labs Within Last 24 Hours Reviewed    Imaging:    Imaging Reports Reviewed Today Include:   Imaging Personally Reviewed by Myself Includes:      Recent Cultures (last 7 days):           Last 24 Hours Medication List:     Current Facility-Administered Medications:  acetaminophen 650 mg Oral Q6H Madison Caro MD   atorvastatin 10 mg Oral Daily Madison Caro MD   cholecalciferol 1,000 Units Oral Daily Madison Caro MD   finasteride 5 mg Oral Daily Madison Caro MD   furosemide 20 mg Oral Daily Madison Caro MD   heparin (porcine) 5,000 Units Subcutaneous Q8H [address] Madison Caro MD   insulin lispro 1-5 Units Subcutaneous TID AC Madison Caro MD   insulin lispro 1-5 Units Subcutaneous HS Madison Caro MD   lisinopril 10 mg Oral Daily Madison Caro MD   tamsulosin 0 4 mg Oral BID Kassi Dc PA-C        Today, Patient Was Seen By: Madison Caro MD    ** Please Note: Dictation voice to text software may have been used in the creation of this document   **

## 2019-10-08 NOTE — PROGRESS NOTES
10/08/19 TOYIN Pereira 39 Involvement Patient active with Baptism;Jew active in support   Spiritual Leader   (Adonay Coughlin, [address] [place] [address])   [address] Aware/Contacted Leader/Baptism aware of patient's status   Spiritual Beliefs/Perceptions   Concept of God Accepting   Relationship with God Close   Spiritual Strengths   (Life long Ca[employer] radha   Close community belonging )   Support Systems Jew/radha community   Stress Factors   Patient Stress Factors None identified   Coping Responses   Patient Coping Accepting;Open/discussion   Plan of Care   Assessment Completed by: Unit visit

## 2019-10-08 NOTE — PLAN OF CARE
Problem: OCCUPATIONAL THERAPY ADULT  Goal: Performs self-care activities at highest level of function for planned discharge setting  See evaluation for individualized goals  Outcome: Progressing  Note:   Limitation: Decreased ADL status, Decreased endurance, Decreased self-care trans  Prognosis: Fair  Assessment: Patient participated in Skilled OT session this date with interventions consisting of safety awareness and fall prevention techniques,  therapeutic activities to: increase activity tolerance and increase dynamic sit/ stand balance during functional activity    Patient agreeable to OT treatment session, upon arrival patient was found seated OOB to Recliner  Treatment session as follows: OT approached room due to pt chair alarm set off due to pt trying to reposition 2* pain  Pt assisted from recliner to bed with Min-Mod A x2  Patient continues to be functioning below baseline level, occupational performance remains limited secondary to factors listed above and increased risk for falls and injury  From OT standpoint, recommendation at time of d/c would be Short Term Rehab  Patient to benefit from continued Occupational Therapy treatment while in the hospital to address deficits as defined above and maximize level of functional independence with ADLs and functional mobility  Pt left with call bell in reach, tray table in reach, needs met, SCDs on, RN Lizz aware of pt request for pain medication       OT Discharge Recommendation: Short Term Rehab  OT - OK to Discharge: Yes(to STR)

## 2019-10-08 NOTE — OCCUPATIONAL THERAPY NOTE
Occupational Therapy Treatment Note    Patient Name: Phillip MATSONC'K Date: 10/8/2019       10/08/19 1010   Restrictions/Precautions   Weight Bearing Precautions Per Order Yes   RLE Weight Bearing Per Order WBAT   Other Precautions Fall Risk;Pain   General   Response to Previous Treatment Patient with no complaints from previous session   Lifestyle   Autonomy Pt approached in recliner chair with legs elevated, trying to get into bed due to discomfort   Pain Assessment   Pain Assessment Amezcua-Baker FACES   Amezcua-Baker FACES Pain Rating 6   Pain Location Hip   Pain Orientation Right   Hospital Pain Intervention(s) Repositioned; Ambulation/increased activity   ADL   LB Dressing Assistance 2  Maximal Assistance   LB Dressing Deficit Don/doff R sock; Don/doff L sock   Bed Mobility   Sit to Supine 3  Moderate assistance   Additional items Assist x 2   Transfers   Sit to Stand 4  Minimal assistance   Additional items Assist x 2   Stand to Sit 4  Minimal assistance   Additional items Assist x 2   Stand pivot 3  Moderate assistance   Additional items Assist x 2;Verbal cues  (RW)   Cognition   Overall Cognitive Status [facility]   Arousal/Participation Alert; Cooperative   Attention Within functional limits   Orientation Level Oriented to person;Oriented to place; Disoriented to time;Disoriented to situation   Memory Within functional limits   Following Commands Follows one step commands without difficulty   Activity Tolerance   Activity Tolerance Patient tolerated treatment well   Medical Staff Made Aware RASHEEDA Zamudio   Assessment   Assessment Patient participated in Skilled OT session this date with interventions consisting of safety awareness and fall prevention techniques,  therapeutic activities to: increase activity tolerance and increase dynamic sit/ stand balance during functional activity    Patient agreeable to OT treatment session, upon arrival patient was found seated OOB to Recliner    Treatment session as follows: OT approached room due to pt chair alarm set off due to pt trying to reposition 2* pain  Pt assisted from recliner to bed with Min-Mod A x2  Patient continues to be functioning below baseline level, occupational performance remains limited secondary to factors listed above and increased risk for falls and injury  From OT standpoint, recommendation at time of d/c would be Short Term Rehab  Patient to benefit from continued Occupational Therapy treatment while in the hospital to address deficits as defined above and maximize level of functional independence with ADLs and functional mobility  Pt left with call bell in reach, tray table in reach, needs met, SCDs on, RN Lizz aware of pt request for pain medication     Plan   OT Treatment Day 1   Recommendation   OT Discharge Recommendation Short Term Rehab   OT - OK to Discharge Yes  (to STR)     Lemuel Perez, OT

## 2019-10-08 NOTE — ED PROVIDER NOTES
History  Chief Complaint   Patient presents with    Possible UTI     pt presents to ER with staff stating he has been weak with urinary frequency  staff state they think he might have a UTI     HPI     19-year-old male with history of chronic kidney disease presents for difficulty walking as well as urinary urgency and hesitancy  Patient is a poor historian likely secondary to age  He is a  and former missionary, currently living in essentially a community [facility] without any significant medical personnel to help care for him  The past two days he has been finding it difficult to walk and his friend and colleague at the bedside states that they have been trying to carry him around as needed  Today has been peeing more often but very little  He also complains of sensitivity in his buttocks bilaterally  Denies this is numbness paresthesias or weakness  It seems that he has some mild pain that is vague  He denies abdominal pain chest pain shortness of breath no other modifying factors or associated symptoms  No hematuria as far as they are aware  On exam he appears his stated age he is somewhat fragile  He has no abdominal tenderness  He has some pain in the pelvic area although it is global and I cannot pinpoint an exact location he has been ranging his hips without difficulty however  He is neurovascularly intact otherwise    Assessment plan:  Urinary symptoms plan to check a urinalysis for infection  CT abdomen pelvis to evaluate for occult injury  Labs for worsening kidney disease infection or other metabolic derangement    Prior to Admission Medications   Prescriptions Last Dose Informant Patient Reported? Taking?    Cholecalciferol (VITAMIN D) 2000 UNITS CAPS   Yes Yes   Sig: Take 2,000 Units by mouth daily with breakfast   aspirin 81 mg chewable tablet   Yes Yes   Sig: Chew 81 mg daily   atorvastatin (LIPITOR) 10 mg tablet   Yes Yes   Sig: Take 10 mg by mouth daily   canagliflozin [facility]) 100 mg   Yes No   Sig: Take 300 mg by mouth daily before breakfast     finasteride (PROSCAR) 5 mg tablet   Yes Yes   Sig: Take 5 mg by mouth daily   furosemide (LASIX) 20 mg tablet   No Yes   Sig: Take 1 tablet by mouth daily for 30 days   Patient taking differently: Take 20 mg by mouth every other day     glipiZIDE (GLUCOTROL) 10 mg tablet   Yes Yes   Sig: Take 10 mg by mouth 2 (two) times a day before meals   metFORMIN (GLUCOPHAGE) 500 mg tablet   Yes Yes   Sig: Take 1,000 mg by mouth 2 (two) times a day with meals     metFORMIN (GLUCOPHAGE) 500 mg tablet   Yes No   Sig: Take 500 mg by mouth daily with lunch   quinapril (ACCUPRIL) 20 mg tablet   Yes Yes   Sig: Take 20 mg by mouth 2 (two) times a day   repaglinide (PRANDIN) 2 mg tablet   Yes Yes   Sig: Take 2 mg by mouth 3 (three) times a day before meals     tamsulosin (FLOMAX) 0 4 mg   Yes Yes   Sig: Take 0 4 mg by mouth 2 (two) times a day        Facility-Administered Medications: None       Past Medical History:   Diagnosis Date    Amblyopia     Diabetes mellitus (HCC)     Elevated PSA     Hard of hearing     Hyperlipidemia     Hypertension        Past Surgical History:   Procedure Laterality Date    HERNIA REPAIR         History reviewed  No pertinent family history  I have reviewed and agree with the history as documented  Social History     Tobacco Use    Smoking status: Never Smoker    Smokeless tobacco: Never Used   Substance Use Topics    Alcohol use: Yes     Comment: manhattens for "big occasions"     Drug use: No        Review of Systems   Constitutional: Negative for chills, fatigue and fever  Eyes: Negative for photophobia and visual disturbance  Respiratory: Negative for cough and shortness of breath  Cardiovascular: Negative for chest pain, palpitations and leg swelling  Gastrointestinal: Negative for diarrhea, nausea and vomiting  Endocrine: Negative for polydipsia and polyuria     Genitourinary: Positive for difficulty urinating and frequency  Negative for decreased urine volume and dysuria  Musculoskeletal: Negative for back pain, neck pain and neck stiffness  Skin: Negative for color change and rash  Allergic/Immunologic: Negative for environmental allergies and immunocompromised state  Neurological: Negative for dizziness and headaches  Hematological: Negative for adenopathy  Does not bruise/bleed easily  Psychiatric/Behavioral: Negative for dysphoric mood  The patient is not nervous/anxious  Physical Exam  Physical Exam   Constitutional: He is oriented to person, place, and time  He appears well-developed  HENT:   Head: Normocephalic and atraumatic  Right Ear: External ear normal    Left Ear: External ear normal    Mouth/Throat: Oropharynx is clear and moist    Eyes: Pupils are equal, round, and reactive to light  Conjunctivae and EOM are normal    Neck: Normal range of motion  Neck supple  No JVD present  No thyromegaly present  Cardiovascular: Normal rate, regular rhythm and normal heart sounds  Exam reveals no gallop and no friction rub  No murmur heard  Pulmonary/Chest: Effort normal and breath sounds normal  No respiratory distress  He has no wheezes  He has no rales  Abdominal: Soft  Bowel sounds are normal  He exhibits no distension  There is no rebound and no guarding  Musculoskeletal: Normal range of motion  He exhibits no edema  Lymphadenopathy:     He has no cervical adenopathy  Neurological: He is alert and oriented to person, place, and time  No cranial nerve deficit  Skin: Skin is warm  Psychiatric: He has a normal mood and affect  His behavior is normal    Nursing note and vitals reviewed        Vital Signs  ED Triage Vitals   Temperature Pulse Respirations Blood Pressure SpO2   10/06/19 1503 10/06/19 1503 10/06/19 1503 10/06/19 1503 10/06/19 1503   (!) 96 6 °F (35 9 °C) 75 19 152/66 100 %      Temp Source Heart Rate Source Patient Position - Orthostatic VS BP Location FiO2 (%)   10/06/19 1900 10/06/19 1503 10/06/19 1503 10/06/19 1503 --   Oral Monitor Lying Right arm       Pain Score       10/06/19 1503       1           Vitals:    10/06/19 2300 10/07/19 0812 10/07/19 1531 10/07/19 2300   BP: 132/63 128/59 129/61 127/62   Pulse: 59 60 80 78   Patient Position - Orthostatic VS: Lying Lying Lying Lying         Visual Acuity      ED Medications  Medications   atorvastatin (LIPITOR) tablet 10 mg (10 mg Oral Given 10/7/19 0902)   cholecalciferol (VITAMIN D3) tablet 1,000 Units (1,000 Units Oral Given 10/7/19 0901)   finasteride (PROSCAR) tablet 5 mg (5 mg Oral Given 10/7/19 0901)   furosemide (LASIX) tablet 20 mg (20 mg Oral Given 10/7/19 0901)   lisinopril (ZESTRIL) tablet 10 mg (10 mg Oral Given 10/7/19 0902)   heparin (porcine) subcutaneous injection 5,000 Units (5,000 Units Subcutaneous Given 10/8/19 0602)   insulin lispro (HumaLOG) 100 units/mL subcutaneous injection 1-5 Units (1 Units Subcutaneous Given 10/7/19 1721)   insulin lispro (HumaLOG) 100 units/mL subcutaneous injection 1-5 Units (1 Units Subcutaneous Given 10/7/19 2228)   acetaminophen (TYLENOL) tablet 650 mg (650 mg Oral Given 10/8/19 0200)   tamsulosin (FLOMAX) capsule 0 4 mg (0 4 mg Oral Given 10/7/19 1854)       Diagnostic Studies  Results Reviewed     Procedure Component Value Units Date/Time    Vitamin B12 [[id number]]  (Normal) Collected:  10/07/19 0504    Lab Status:  Final result Specimen:  Blood from Arm, Left Updated:  10/07/19 1109     Vitamin B-12 254 pg/mL     Folate [[id number]]  (Normal) Collected:  10/07/19 0504    Lab Status:  Final result Specimen:  Blood from Arm, Left Updated:  10/07/19 1109     Folate 14 7 ng/mL     Urine Microscopic [[id number]]  (Normal) Collected:  10/06/19 1717    Lab Status:  Final result Specimen:  Urine, Indwelling Tovar Catheter Updated:  10/06/19 1904     RBC, UA None Seen /hpf      WBC, UA None Seen /hpf      Epithelial Cells None Seen /hpf      Bacteria, UA None Seen /hpf     UA w Reflex to Microscopic w Reflex to Culture [[id number]]  (Abnormal) Collected:  10/06/19 1717    Lab Status:  Final result Specimen:  Urine, Indwelling Tovar Catheter Updated:  10/06/19 1846     Color, UA Yellow     Clarity, UA Clear     Specific Gravity, UA 1 010     pH, UA 6 0     Leukocytes, UA Elevated glucose may cause decreased leukocyte values   See urine microscopic for [facility] result/     Nitrite, UA Negative     Protein, UA Negative mg/dl      Glucose, UA >=1000 (1%) mg/dl      Ketones, UA Negative mg/dl      Urobilinogen, UA 0 2 E U /dl      Bilirubin, UA Negative     Blood, UA Negative    Comprehensive metabolic panel [[id number]]  (Abnormal) Collected:  10/06/19 1527    Lab Status:  Final result Specimen:  Blood from Arm, Left Updated:  10/06/19 1605     Sodium 139 mmol/L      Potassium 4 2 mmol/L      Chloride 102 mmol/L      CO2 29 mmol/L      ANION GAP 8 mmol/L      BUN 41 mg/dL      Creatinine 1 61 mg/dL      Glucose 111 mg/dL      Calcium 9 7 mg/dL      AST 21 U/L      ALT 22 U/L      Alkaline Phosphatase 100 U/L      Total Protein 7 3 g/dL      Albumin 3 6 g/dL      Total Bilirubin 0 40 mg/dL      eGFR 37 ml/min/1 73sq m     Narrative:       [place] guidelines for Chronic Kidney Disease (CKD):     Stage 1 with normal or high GFR (GFR > 90 mL/min/1 73 square meters)    Stage 2 Mild CKD (GFR = 60-89 mL/min/1 73 square meters)    Stage 3A Moderate CKD (GFR = 45-59 mL/min/1 73 square meters)    Stage 3B Moderate CKD (GFR = 30-44 mL/min/1 73 square meters)    Stage 4 Severe CKD (GFR = 15-29 mL/min/1 73 square meters)    Stage 5 End Stage CKD (GFR <15 mL/min/1 73 square meters)  Note: GFR calculation is accurate only with a steady state creatinine    CBC and differential [[id number]]  (Abnormal) Collected:  10/06/19 1527    Lab Status:  Final result Specimen:  Blood from Arm, Left Updated:  10/06/19 1549     WBC 6 57 Thousand/uL      RBC 3 72 Million/uL Hemoglobin 11 8 g/dL      Hematocrit 36 0 %      MCV 97 fL      MCH 31 7 pg      MCHC 32 8 g/dL      RDW 15 5 %      MPV 9 3 fL      Platelets 738 Thousands/uL      Neutrophils Relative 70 %      Immat GRANS % 0 %      Lymphocytes Relative 16 %      Monocytes Relative 13 %      Eosinophils Relative 1 %      Basophils Relative 0 %      Neutrophils Absolute 4 53 Thousands/µL      Immature Grans Absolute 0 02 Thousand/uL      Lymphocytes Absolute 1 07 Thousands/µL      Monocytes Absolute 0 85 Thousand/µL      Eosinophils Absolute 0 08 Thousand/µL      Basophils Absolute 0 02 Thousands/µL                  XR pelvis ap only 1 or 2 vw   Final Result by Bob Dsouza MD (10/07 1107)      Slightly displaced right inferior pubic ramus fracture similar to prior CT         Workstation performed: [id number]         CT abdomen pelvis wo contrast   Final Result by Blank To MD (10/06 1711)      1  Minimally displaced right inferior pubic ramus fracture without evidence of healing, suggesting acuity  2   Cholelithiasis without evidence of cholecystitis  3   Enlarged prostate gland with moderate distention of the urinary bladder  The study was marked in [facility] for immediate notification  Workstation performed: [id number]                    Procedures  Procedures       ED Course                               MDM  Number of Diagnoses or Management Options  Inferior pubic ramus fracture, right, closed, initial encounter [facility]): new and requires workup  Urinary retention: new and requires workup  Diagnosis management comments: Inferior pubic ramus fracture  -no history of fall could have happened in the night sometime    Non operative    Urinary retention, patient unable to urinate on his own  -likely secondary prostate enlargement, does have a history of elevated prostate levels although he cannot tell me how high and I have no record of it other than that mentioned in his medical history   -Tovar inserted will check urine for infection but doubtful       Amount and/or Complexity of Data Reviewed  Clinical lab tests: reviewed and ordered  Tests in the radiology section of CPT®: reviewed and ordered  Tests in the medicine section of CPT®: ordered and reviewed  Independent visualization of images, tracings, or specimens: yes        Disposition  Final diagnoses:   Inferior pubic ramus fracture, right, closed, initial encounter ([facility] [address] )   Urinary retention   CKD (chronic kidney disease)   Enlarged prostate     Time reflects when diagnosis was documented in both MDM as applicable and the Disposition within this note     Time User Action Codes Description Comment    10/6/2019  6:08 PM Indigo Bethea Add [S32 591A] Inferior pubic ramus fracture, right, closed, initial encounter ([facility] [address] )     10/6/2019  6:08 PM Ellie GREY Add [R33 9] Urinary retention     10/8/2019  7:33 AM Ellie Flores F Add [N18 9] CKD (chronic kidney disease)     10/8/2019  7:33 AM Indigo Bethea Add [N40 0] Enlarged prostate       ED Disposition     ED Disposition Condition Date/Time Comment    Admit Stable Sun Oct 6, 2019  6:01 PM Case was discussed with Lawson Veloz and the patient's admission status was agreed to be Admission Status: inpatient status to the service of Dr Lawson Veloz a           Follow-up Information     Follow up With Specialties Details Why Contact Info    Trevor Abdi MD Urology Follow up  [address] [facility] [address] [facility]      Mary Hathaway MD Orthopedic Surgery Follow up in 6 week(s)  [phone] [facility]  [phone]            Current Discharge Medication List      CONTINUE these medications which have NOT CHANGED    Details   aspirin 81 mg chewable tablet Chew 81 mg daily      atorvastatin (LIPITOR) 10 mg tablet Take 10 mg by mouth daily      Cholecalciferol (VITAMIN D) 2000 UNITS CAPS Take 2,000 Units by mouth daily with breakfast      finasteride (PROSCAR) 5 mg tablet Take 5 mg by mouth daily      furosemide (LASIX) 20 mg tablet Take 1 tablet by mouth daily for 30 days  Qty: 30 tablet, Refills: 0      glipiZIDE (GLUCOTROL) 10 mg tablet Take 10 mg by mouth 2 (two) times a day before meals      !! metFORMIN (GLUCOPHAGE) 500 mg tablet Take 1,000 mg by mouth 2 (two) times a day with meals        quinapril (ACCUPRIL) 20 mg tablet Take 20 mg by mouth 2 (two) times a day      repaglinide (PRANDIN) 2 mg tablet Take 2 mg by mouth 3 (three) times a day before meals        tamsulosin (FLOMAX) 0 4 mg Take 0 4 mg by mouth 2 (two) times a day        canagliflozin (INVOKANA) 100 mg Take 300 mg by mouth daily before breakfast        !! metFORMIN (GLUCOPHAGE) 500 mg tablet Take 500 mg by mouth daily with lunch       !! - Potential duplicate medications found  Please discuss with provider  No discharge procedures on file      ED Provider  Electronically Signed by           Jessica Cuevas DO  10/08/19 4773

## 2019-10-08 NOTE — ASSESSMENT & PLAN NOTE
Incidental finding of a closed nondisplaced fracture of the right pubic rami on CT scan pelvis, unable to determine aetiology of fracture  Patient is a poor historian and does not recollect any recent falls, although he does have scattered mild bruises on his legs        Plan  - WBAT for now  - Pt/OT  - DVT prophylaxis  - Pain control  - Ortho on board

## 2019-10-08 NOTE — PHYSICAL THERAPY NOTE
PT tx     10/08/19 1011   Pain Assessment   Pain Assessment Amezcua-Baker FACES   Amezcua-Baker FACES Pain Rating 6   Pain Location Hip   Pain Orientation Right   Hospital Pain Intervention(s) Repositioned   Restrictions/Precautions   Weight Bearing Precautions Per Order Yes   RLE Weight Bearing Per Order WBAT   Other Precautions Fall Risk   Cognition   Overall Cognitive Status WFL   Arousal/Participation Alert; Cooperative   Attention Within functional limits   Orientation Level Oriented to person   Memory Within functional limits   Following Commands Follows one step commands without difficulty   Subjective   Subjective Pt attempting to scoot out of recliner   Bed Mobility   Sit to Supine 3  Moderate assistance   Additional items Assist x 2   Transfers   Sit to Stand 4  Minimal assistance   Additional items Assist x 2   Stand to Sit 4  Minimal assistance   Additional items Assist x 2   Stand pivot 3  Moderate assistance   Additional items Assist x 2;Verbal cues;Armrests  (rw)   Ambulation/Elevation   Gait pattern Narrow JAMES; Inconsistent nithin; Short stride; Step to;Shuffling;Retropulsion   Gait Assistance 3  Moderate assist   Additional items Assist x 2   Assistive Device Rolling walker   Distance 5'   Wheelchair Activities   Wheelchair Cushion Pressure relieving   Balance   Static Sitting Fair   Dynamic Sitting Fair -   Static Standing Poor +   Dynamic Standing Poor +   Ambulatory Poor +   Endurance Deficit   Endurance Deficit Yes   Endurance Deficit Description tires quickly   Activity Tolerance   Activity Tolerance Patient tolerated treatment well   Nurse Made Aware yes   Assessment   Prognosis Good   Problem List Decreased strength;Decreased endurance; Impaired balance;Decreased mobility; Decreased cognition;Decreased safety awareness;Decreased skin integrity   Assessment Pt able to transfer btb with rw and Ax2 requesting pain puill and RN informed   NEed in reach sccds reapplied   Barriers to Discharge Inaccessible home environment;Decreased caregiver support   Goals   Patient Goals get better   Plan   Treatment/Interventions ADL retraining;Functional transfer training;LE strengthening/ROM; Therapeutic exercise; Endurance training;Patient/family training;Equipment eval/education; Bed mobility;Gait training   Progress Progressing toward goals   PT Frequency 5x/wk   Recommendation   Recommendation Short-term skilled PT   Equipment Recommended Walker   PT - OK to Discharge Yes   Additional Comments   (to sTr with medical clearance)   Sapphire Whiting, PT

## 2019-10-08 NOTE — ASSESSMENT & PLAN NOTE
Patient presenting with ambulatory dysfunction, found to have a right pelvic fracture      Plan  · WBAT  · Plan for discharge tomorrow  · Pain control with tylenol and lidocaine patch  · PT/OT  · CM consult  · Ortho consulted, appreciate recs

## 2019-10-09 PROBLEM — Z97.8 FOLEY CATHETER IN PLACE: Status: ACTIVE | Noted: 2019-10-09

## 2019-10-09 PROBLEM — R31.0 GROSS HEMATURIA: Status: ACTIVE | Noted: 2019-10-09

## 2019-10-09 LAB
ANION GAP SERPL CALCULATED.3IONS-SCNC: 11 MMOL/L (ref 4–13)
BUN SERPL-MCNC: 40 MG/DL (ref 5–25)
CALCIUM SERPL-MCNC: 9.8 MG/DL (ref 8.3–10.1)
CHLORIDE SERPL-SCNC: 104 MMOL/L (ref 100–108)
CO2 SERPL-SCNC: 23 MMOL/L (ref 21–32)
CREAT SERPL-MCNC: 1.44 MG/DL (ref 0.6–1.3)
ERYTHROCYTE [DISTWIDTH] IN BLOOD BY AUTOMATED COUNT: 14.6 % (ref 11.6–15.1)
GFR SERPL CREATININE-BSD FRML MDRD: 42 ML/MIN/1.73SQ M
GLUCOSE SERPL-MCNC: 159 MG/DL (ref 65–140)
GLUCOSE SERPL-MCNC: 167 MG/DL (ref 65–140)
GLUCOSE SERPL-MCNC: 233 MG/DL (ref 65–140)
GLUCOSE SERPL-MCNC: 257 MG/DL (ref 65–140)
GLUCOSE SERPL-MCNC: 311 MG/DL (ref 65–140)
HCT VFR BLD AUTO: 37 % (ref 36.5–49.3)
HGB BLD-MCNC: 11.8 G/DL (ref 12–17)
MCH RBC QN AUTO: 31.1 PG (ref 26.8–34.3)
MCHC RBC AUTO-ENTMCNC: 31.9 G/DL (ref 31.4–37.4)
MCV RBC AUTO: 97 FL (ref 82–98)
PLATELET # BLD AUTO: 188 THOUSANDS/UL (ref 149–390)
PMV BLD AUTO: 9.6 FL (ref 8.9–12.7)
POTASSIUM SERPL-SCNC: 4.2 MMOL/L (ref 3.5–5.3)
RBC # BLD AUTO: 3.8 MILLION/UL (ref 3.88–5.62)
SODIUM SERPL-SCNC: 138 MMOL/L (ref 136–145)
WBC # BLD AUTO: 8.5 THOUSAND/UL (ref 4.31–10.16)

## 2019-10-09 PROCEDURE — 97530 THERAPEUTIC ACTIVITIES: CPT

## 2019-10-09 PROCEDURE — 80048 BASIC METABOLIC PNL TOTAL CA: CPT | Performed by: INTERNAL MEDICINE

## 2019-10-09 PROCEDURE — 99232 SBSQ HOSP IP/OBS MODERATE 35: CPT | Performed by: PHYSICIAN ASSISTANT

## 2019-10-09 PROCEDURE — 99232 SBSQ HOSP IP/OBS MODERATE 35: CPT | Performed by: INTERNAL MEDICINE

## 2019-10-09 PROCEDURE — 85027 COMPLETE CBC AUTOMATED: CPT | Performed by: INTERNAL MEDICINE

## 2019-10-09 PROCEDURE — 82948 REAGENT STRIP/BLOOD GLUCOSE: CPT

## 2019-10-09 RX ORDER — INSULIN GLARGINE 100 [IU]/ML
10 INJECTION, SOLUTION SUBCUTANEOUS
Status: DISCONTINUED | OUTPATIENT
Start: 2019-10-09 | End: 2019-10-10

## 2019-10-09 RX ADMIN — MELATONIN 1000 UNITS: at 08:56

## 2019-10-09 RX ADMIN — LISINOPRIL 10 MG: 10 TABLET ORAL at 08:56

## 2019-10-09 RX ADMIN — INSULIN LISPRO 3 UNITS: 100 INJECTION, SOLUTION INTRAVENOUS; SUBCUTANEOUS at 11:59

## 2019-10-09 RX ADMIN — INSULIN LISPRO 1 UNITS: 100 INJECTION, SOLUTION INTRAVENOUS; SUBCUTANEOUS at 08:56

## 2019-10-09 RX ADMIN — FINASTERIDE 5 MG: 5 TABLET, FILM COATED ORAL at 08:56

## 2019-10-09 RX ADMIN — INSULIN GLARGINE 10 UNITS: 100 INJECTION, SOLUTION SUBCUTANEOUS at 21:40

## 2019-10-09 RX ADMIN — HEPARIN SODIUM 5000 UNITS: 5000 INJECTION INTRAVENOUS; SUBCUTANEOUS at 06:13

## 2019-10-09 RX ADMIN — FUROSEMIDE 20 MG: 20 TABLET ORAL at 08:56

## 2019-10-09 RX ADMIN — HEPARIN SODIUM 5000 UNITS: 5000 INJECTION INTRAVENOUS; SUBCUTANEOUS at 21:40

## 2019-10-09 RX ADMIN — ACETAMINOPHEN 650 MG: 325 TABLET, FILM COATED ORAL at 21:39

## 2019-10-09 RX ADMIN — ACETAMINOPHEN 650 MG: 325 TABLET, FILM COATED ORAL at 12:32

## 2019-10-09 RX ADMIN — INSULIN LISPRO 2 UNITS: 100 INJECTION, SOLUTION INTRAVENOUS; SUBCUTANEOUS at 21:41

## 2019-10-09 RX ADMIN — TAMSULOSIN HYDROCHLORIDE 0.4 MG: 0.4 CAPSULE ORAL at 08:56

## 2019-10-09 RX ADMIN — ACETAMINOPHEN 650 MG: 325 TABLET, FILM COATED ORAL at 08:56

## 2019-10-09 RX ADMIN — ATORVASTATIN CALCIUM 10 MG: 10 TABLET, FILM COATED ORAL at 08:56

## 2019-10-09 RX ADMIN — TAMSULOSIN HYDROCHLORIDE 0.4 MG: 0.4 CAPSULE ORAL at 17:51

## 2019-10-09 NOTE — ASSESSMENT & PLAN NOTE
Patient with history of chronic kidney disease, on admission, creatinine was elevated to 1 61, but GFR stayed the same  · Baseline creatinine 1 59   · Creatinine currently stable at 1 44  · Hydrate and trend BMP daily

## 2019-10-09 NOTE — PROGRESS NOTES
Pt mostly removed navarro catheter, bright red blood present in navarro tubing  Navarro balloon was drained and then navarro fully removed, blood clots present at tip of navarro  Navarro reinserted without difficulty with Vale Holman RN, initial urine returned was bright red and bloody  Pt did not report any pain during insertion

## 2019-10-09 NOTE — ASSESSMENT & PLAN NOTE
Patient presenting with ambulatory dysfunction, found to have a right pelvic fracture      Plan  · WBAT  · Hold on discharge, given self removal of Tovar catheter with resultant gross hematuria  · Start CBI, urology on board  · Trend hemoglobin  ·  Pain control with tylenol and lidocaine patch  · PT/OT  · CM consult  · Ortho consulted, appreciate recs

## 2019-10-09 NOTE — ASSESSMENT & PLAN NOTE
Gross hematuria, started after patient self removed his Tovar catheter overnight on 10/08/2019, Tovar catheter replaced but patient still with gross bloody urine    · Start CBI, urology on board, wean off as bleeding improves  · Monitor hemoglobin  · Continue with DVT prophylaxis with Lovenox for now given recent pelvic fracture and risk of pulmonary embolism

## 2019-10-09 NOTE — PHYSICAL THERAPY NOTE
PT tx     10/09/19 1501   Pain Assessment   Pain Assessment Amezcua-Baker FACES   Amezcua-Baker FACES Pain Rating 4   Pain Location Pelvis   Pain Orientation Right  (with wb)   Restrictions/Precautions   Weight Bearing Precautions Per Order No   RLE Weight Bearing Per Order WBAT   General   Chart Reviewed Yes   Cognition   Overall Cognitive Status WFL   Arousal/Participation Alert   Attention Within functional limits   Orientation Level Oriented to person;Oriented to place   Memory Within functional limits   Following Commands Follows one step commands with increased time or repetition   Comments Pt pulled navarro out earlier with balloon still inflated causing some injury; re inserted and on irrigation   Subjective   Subjective Pt agrees to mobilize    Transfers   Sit to Stand 4  Minimal assistance   Additional items Assist x 2   Stand to Sit 4  Minimal assistance   Additional items Assist x 2;Verbal cues;Armrests; Increased time required  (practiced x 2 hand over hand assist plus cues)   Stand pivot 4  Minimal assistance   Additional items Assist x 2;Verbal cues   Balance   Static Sitting Good   Dynamic Sitting Fair   Static Standing Fair   Dynamic Standing Fair -   Endurance Deficit   Endurance Deficit No   Activity Tolerance   Activity Tolerance Patient tolerated treatment well   Nurse Made Aware yes   Exercises   Balance training  stood x 2 min with rw adn assist for inital balance then cg w/ occas tactile cues   Assessment   Prognosis Good   Problem List Decreased strength;Decreased endurance; Impaired balance;Decreased mobility;Orthopedic restrictions   Assessment Better balance tih RW after practicce for sit<>stnad from chair  Still requires 100% v cues adn hand over hand for safe trnasfer technique  Hopes to transfer to rehab tomorrow  Barriers to Discharge   (medicla status)   Goals   Patient Goals get better   Plan   Treatment/Interventions ADL retraining;Functional transfer training; Endurance training;Patient/family training;Equipment eval/education;Gait training;Spoke to nursing;OT   Progress Progressing toward goals   PT Frequency 5x/wk   Recommendation   Recommendation Short-term skilled PT   Equipment Recommended Walker   PT - OK to Discharge Yes   Additional Comments   (to STR with medical clearance)   Romeo Hogan, PT

## 2019-10-09 NOTE — ASSESSMENT & PLAN NOTE
Chronic bilateral lower extremity pitting pedal edema, 3+ right greater than 2+ left, might be secondary to lymphatic obstruction  · Resolving, left leg no edema, right leg 1+  · Observe for now, continue with home dose lasix

## 2019-10-09 NOTE — PROGRESS NOTES
Progress Note - Radha Backer 9/14/1927, 80 y o  male MRN: [id number]    Unit/Bed#: [address] [place] [address] Encounter: [id number]    Primary Care Provider: Josiah Johnson MD   Date and time admitted to hospital: 10/6/2019  3:02 PM        * Ambulatory dysfunction  Assessment & Plan  Patient presenting with ambulatory dysfunction, found to have a right pelvic fracture      Plan  · WBAT  · Hold on discharge, given self removal of Tovar catheter with resultant gross hematuria  · Start CBI, urology on board  · Trend hemoglobin  ·  Pain control with tylenol and lidocaine patch  · PT/OT  · CM consult  · Ortho consulted, appreciate recs    Urinary retention due to benign prostatic hyperplasia  Assessment & Plan  Patient presenting with acute urinary retention, most likely secondary to BPH, status post Tovar catheter placement in the ER on 10/06/2019  · Overnight, patient removed his catheter on 10/08/2019 with resultant gross hematuria, Tovar now replaced, CBI started, to wean off CBI as bleeding improves  · Urology on board  ·  Continue with home medications Flomax and finasteride    Closed displaced fracture of right pubis with routine healing  Assessment & Plan  Incidental finding of a closed nondisplaced fracture of the right pubic rami on CT scan pelvis, unable to determine aetiology of fracture  Patient is a poor historian and does not recollect any recent falls, although he does have scattered mild bruises on his legs  Plan  - WBAT for now  - Pt/OT  - DVT prophylaxis  - Pain control  - Ortho on board  - when stable for discharge, would need rehab  Gross hematuria  Assessment & Plan  Gross hematuria, started after patient self removed his Tovar catheter overnight on 10/08/2019, Tovar catheter replaced but patient still with gross bloody urine    · Start CBI, urology on board, wean off as bleeding improves  · Monitor hemoglobin  · Continue with DVT prophylaxis with Lovenox for now given recent pelvic fracture and risk of pulmonary embolism    Navarro catheter in place  Assessment & Plan  Patient presented with urinary retention, and Navarro was placed and 10/06/2019 in the ER  · Plan was to discharge with Navarro catheter, however patient removed his navarro catheter overnight with resultant gross hematuria  · New Navarro catheter placed  · CBI started given gross hematuria  · Trend CBC  · Transfuse if hemoglobin falls less than 7    Anemia  Assessment & Plan  Normocytic normochromic anemia, Hb 11 8, baseline 11 2 -12 7  · Iron levels low, ferritin, folate and B12 levels within normal limts, suggests anemia of chronic kidney disease  · No intervention currently, might EPO eventually  · Keep Hb > 7    Chronic kidney disease (CKD), active medical management without dialysis, stage 3 (moderate) ([facility] [address] )  Assessment & Plan  Patient with history of chronic kidney disease, on admission, creatinine was elevated to 1 61, but GFR stayed the same  · Baseline creatinine 1 59   · Creatinine currently stable at 1 44  · Hydrate and trend BMP daily    Type 2 diabetes mellitus with stage 3 chronic kidney disease, without long-term current use of insulin ([facility] )  Assessment & Plan    Lab Results   Component Value Date    HGBA1C 6 7 (H) 10/07/2019       History of diabetes on metformin, glipizide, repaglinide and Canaglifozin  A1c 6 7  Blood sugar poorly controlled sliding scale, start units Lantus at bedtime  Resume glipizide  Continue with SSI, and Accucheks      Hypertension  Assessment & Plan  History of hypertension on Lasix and quinapril at home         Plan  · Quinapril switched to lisinopril  · Blood pressure stable on lisinopril and Lasix  · Monitor blood pressure per unit protocol    Edema  Assessment & Plan  Chronic bilateral lower extremity pitting pedal edema, 3+ right greater than 2+ left, might be secondary to lymphatic obstruction  · Resolving, left leg no edema, right leg 1+  · Observe for now, continue with home dose lasix      VTE Pharmacologic Prophylaxis:   Pharmacologic:  Enoxaparin  Mechanical VTE Prophylaxis in Place: Yes    Patient Centered Rounds: I have performed bedside rounds with nursing staff today  Discussions with Specialists or Other Care Team Provider:  Urology    Education and Discussions with Family / Patient:  Discussed with patient    Time Spent for Care: 30 minutes  More than 50% of total time spent on counseling and coordination of care as described above  Current Length of Stay: 3 day(s)    Current Patient Status: Inpatient   Certification Statement: The patient will continue to require additional inpatient hospital stay due to Hematuria secondary to removal of Tovar by patient, now requiring CBI    Discharge Plan:  2 days    Code Status: Level 1 - Full Code      Subjective:   Overnight patient pulled out his Tovar catheter  The Tovar was replaced but patient was noted to have significant hematuria with passage of clots    Objective:     Vitals:   Temp (24hrs), Av 4 °F (36 3 °C), Min:96 6 °F (35 9 °C), Max:98 °F (36 7 °C)    Temp:  [96 6 °F (35 9 °C)-98 °F (36 7 °C)] 96 6 °F (35 9 °C)  HR:  [86-93] 88  Resp:  [14-16] 15  BP: (101-141)/(51-80) 132/80  SpO2:  [94 %-99 %] 98 %  Body mass index is 19 07 kg/m²  Input and Output Summary (last 24 hours): Intake/Output Summary (Last 24 hours) at 10/9/2019 1200  Last data filed at 10/9/2019 0600  Gross per 24 hour   Intake    Output 1550 ml   Net -1550 ml       Physical Exam:     Physical Exam   Constitutional: No distress  Cardiovascular: Normal rate and regular rhythm  Exam reveals no friction rub  Murmur heard  Pulmonary/Chest: Effort normal and breath sounds normal  No stridor  No respiratory distress  He has no wheezes  He has no rales  Abdominal: Soft  Bowel sounds are normal  He exhibits no distension  There is no tenderness  There is no guarding     Genitourinary:   Genitourinary Comments: Tovar catheter in place draining bloody urine, patient not in any discomfort   Musculoskeletal: He exhibits edema (1+ pedal edema on the right, left foot normal)  Neurological: He is alert  Skin: Skin is warm  Vitals reviewed  Additional Data:     Labs:    Results from last 7 days   Lab Units 10/09/19  0704  10/06/19  1527   WBC Thousand/uL 8 50   < > 6 57   HEMOGLOBIN g/dL 11 8*   < > 11 8*   HEMATOCRIT % 37 0   < > 36 0*   PLATELETS Thousands/uL 188   < > 184   NEUTROS PCT %  --   --  70   LYMPHS PCT %  --   --  16   MONOS PCT %  --   --  13*   EOS PCT %  --   --  1    < > = values in this interval not displayed  Results from last 7 days   Lab Units 10/09/19  0540  10/06/19  1527   SODIUM mmol/L 138   < > 139   POTASSIUM mmol/L 4 2   < > 4 2   CHLORIDE mmol/L 104   < > 102   CO2 mmol/L 23   < > 29   BUN mg/dL 40*   < > 41*   CREATININE mg/dL 1 44*   < > 1 61*   ANION GAP mmol/L 11   < > 8   CALCIUM mg/dL 9 8   < > 9 7   ALBUMIN g/dL  --   --  3 6   TOTAL BILIRUBIN mg/dL  --   --  0 40   ALK PHOS U/L  --   --  100   ALT U/L  --   --  22   AST U/L  --   --  21   GLUCOSE RANDOM mg/dL 167*   < > 111    < > = values in this interval not displayed  Results from last 7 days   Lab Units 10/09/19  1117 10/09/19  0737 10/08/19  2127 10/08/19  1618 10/08/19  1113 10/08/19  0736 10/07/19  2118 10/07/19  1602 10/07/19  1107 10/07/19  0733 10/06/19  2131   POC GLUCOSE mg/dl 311* 159* 231* 245* 210* 143* 160* 171* 345* 86 158*     Results from last 7 days   Lab Units 10/07/19  0504   HEMOGLOBIN A1C % 6 7*               * I Have Reviewed All Lab Data Listed Above  * Additional Pertinent Lab Tests Reviewed:  All Labs Within Last 24 Hours Reviewed    Imaging:    Imaging Reports Reviewed Today Include:   Imaging Personally Reviewed by Myself Includes:      Recent Cultures (last 7 days):           Last 24 Hours Medication List:     Current Facility-Administered Medications:  acetaminophen 650 mg Oral Q6H Linda Mariscal MD   atorvastatin 10 mg Oral Daily Rekha Hood MD   cholecalciferol 1,000 Units Oral Daily Rekha Hood MD   finasteride 5 mg Oral Daily Rekha Hood MD   furosemide 20 mg Oral Daily Rekha Hood MD   heparin (porcine) 5,000 Units Subcutaneous Q8H [address] Rekha Hood MD   insulin glargine 10 Units Subcutaneous HS Rekha Hood MD   insulin lispro 1-5 Units Subcutaneous HS Rekha Hood MD   insulin lispro 1-5 Units Subcutaneous TID AC Rekha Hood MD   lisinopril 10 mg Oral Daily Rekha Hood MD   tamsulosin 0 4 mg Oral BID Kassi Dc PA-C        Today, Patient Was Seen By: Rekha Hood MD    ** Please Note: Dictation voice to text software may have been used in the creation of this document   **

## 2019-10-09 NOTE — ASSESSMENT & PLAN NOTE
Patient presented with urinary retention, and Navarro was placed and 10/06/2019 in the ER    · Plan was to discharge with Navarro catheter, however patient removed his navarro catheter overnight with resultant gross hematuria  · New Navarro catheter placed  · CBI started given gross hematuria  · Trend CBC  · Transfuse if hemoglobin falls less than 7

## 2019-10-09 NOTE — PLAN OF CARE
Problem: PHYSICAL THERAPY ADULT  Goal: Performs mobility at highest level of function for planned discharge setting  See evaluation for individualized goals  Description  Treatment/Interventions: ADL retraining, Functional transfer training, LE strengthening/ROM, Therapeutic exercise, Endurance training, Patient/family training, Equipment eval/education, Bed mobility, Gait training, Spoke to nursing, Spoke to case management, OT  Equipment Recommended: Elsie Thoa       See flowsheet documentation for full assessment, interventions and recommendations  Outcome: Progressing  Note:   Prognosis: Good  Problem List: Decreased strength, Decreased endurance, Impaired balance, Decreased mobility, Orthopedic restrictions  Assessment: Better balance tih RW after practicce for sit<>stnad from chair  Still requires 100% v cues adn hand over hand for safe trnasfer technique  Hopes to transfer to rehab tomorrow  Barriers to Discharge: (medicla status)     Recommendation: Short-term skilled PT     PT - OK to Discharge: Yes    See flowsheet documentation for full assessment

## 2019-10-09 NOTE — ASSESSMENT & PLAN NOTE
Patient presenting with acute urinary retention, most likely secondary to BPH, status post Tovar catheter placement in the ER on 10/06/2019  · Overnight, patient removed his catheter on 10/08/2019 with resultant gross hematuria, Tovar now replaced, CBI started, to wean off CBI as bleeding improves  · Urology on board  ·  Continue with home medications Flomax and finasteride

## 2019-10-09 NOTE — ASSESSMENT & PLAN NOTE
History of hypertension on Lasix and quinapril at home         Plan  · Quinapril switched to lisinopril  · Blood pressure stable on lisinopril and Lasix  · Monitor blood pressure per unit protocol

## 2019-10-09 NOTE — PROGRESS NOTES
Progress Note - General Surgery  Andry Adler 80 y o  male MRN: [id number]  Unit/Bed#: [address] [place] [address] 210-02 Encounter: [id number]    Assessment/Plan:  Urinary retention  With history of BPH  Navarro catheter placed 10/6 and patient was seen by myself on 10/7 in consultation at that time  Plan was to d/c patient with navarro cathter to rehab however patient self removed cath with balloon intact overnight  Navarro replaced without difficulty and patient will need to be d/c'd with cath    Gross hematuria  Patient self-removed navarro catheter overnight with balloon intact  New cath placed with immediate return of gross hematuria  Will start CBI and wean as bleeding improves  Monitor hgb    Pelvic fracture  Fall from home with right inferior pubic ramus fracture  Weight bearing as tolerated  Ortho on board    Subjective/Objective   Chief Complaint: Gross hematuria    Subjective: Patient was initially seen on 10/7 in consultation for urinary retention following a fall and pubic rami fracture  He was doing well with navarro catheter in place until last evening when he pulled the cath out with the balloon intact  He was found incontinent of bloody urine and a new cath was placed without difficulty  He had return of grossly, bright red bloody urine which did not clear over the next hour  He denies pain, dizziness,     Objective:     Blood pressure 132/80, pulse 88, temperature (!) 96 6 °F (35 9 °C), temperature source Tympanic, resp  rate 15, height 5' 6" (1 676 m), weight 53 6 kg (118 lb 2 7 oz), SpO2 98 %  ,Body mass index is 19 07 kg/m²        Intake/Output Summary (Last 24 hours) at 10/9/2019 1031  Last data filed at 10/9/2019 0600  Gross per 24 hour   Intake    Output 1550 ml   Net -1550 ml       Invasive Devices     Peripheral Intravenous Line            Peripheral IV 10/06/19 Left Antecubital 2 days          Drain            Urethral Catheter Double-lumen 16 Fr  less than 1 day                Physical Exam: /80 (BP Location: Right arm)   Pulse 88   Temp (!) 96 6 °F (35 9 °C) (Tympanic)   Resp 15   Ht 5' 6" (1 676 m)   Wt 53 6 kg (118 lb 2 7 oz)   SpO2 98%   BMI 19 07 kg/m²   Lungs: clear to auscultation bilaterally and without wheezes, crackles, or rhonchi  Heart: regular rate and rhythm, S1, S2 normal, no murmur, click, rub or gallop  Abdomen: soft, nontender, non-distended, soft  Male genitalia: gross blood at meatus, navarro catheter in place with flow of bright red bloody urine    Lab, Imaging and other studies:  CBC:   Lab Results   Component Value Date    WBC 8 50 10/09/2019    HGB 11 8 (L) 10/09/2019    HCT 37 0 10/09/2019    MCV 97 10/09/2019     10/09/2019    MCH 31 1 10/09/2019    MCHC 31 9 10/09/2019    RDW 14 6 10/09/2019    MPV 9 6 10/09/2019   , CMP:   Lab Results   Component Value Date    SODIUM 138 10/09/2019    K 4 2 10/09/2019     10/09/2019    CO2 23 10/09/2019    BUN 40 (H) 10/09/2019    CREATININE 1 44 (H) 10/09/2019    CALCIUM 9 8 10/09/2019    EGFR 42 10/09/2019     VTE Pharmacologic Prophylaxis: Heparin  VTE Mechanical Prophylaxis: sequential compression device

## 2019-10-09 NOTE — ASSESSMENT & PLAN NOTE
Incidental finding of a closed nondisplaced fracture of the right pubic rami on CT scan pelvis, unable to determine aetiology of fracture  Patient is a poor historian and does not recollect any recent falls, although he does have scattered mild bruises on his legs  Plan  - WBAT for now  - Pt/OT  - DVT prophylaxis  - Pain control  - Ortho on board  - when stable for discharge, would need rehab

## 2019-10-09 NOTE — PLAN OF CARE
Problem: OCCUPATIONAL THERAPY ADULT  Goal: Performs self-care activities at highest level of function for planned discharge setting  See evaluation for individualized goals  Outcome: Progressing  Note:   Limitation: Decreased ADL status, Decreased endurance, Decreased self-care trans  Prognosis: Fair  Assessment: Patient participated in Skilled OT session this date with interventions consisting of ADL re training with the use of correct body mechnaics, safety awareness and fall prevention techniques,  therapeutic activities to: increase activity tolerance and increase dynamic sit/ stand balance during functional activity    Patient agreeable to OT treatment session, upon arrival patient was found seated OOB to Recliner  Treatment session as follows: Pt on bladder irrigation but agreeable to sit to stand training  Pt requires VCs and Min X2 for sit to stand and stand to sit x2 trials  Pt able to stand 2 mins while linens/pad changed (pt with some bleeding 2* residual from pulling catheter out)  Pt assisted to don clean gown  Patient continues to be functioning below baseline level, occupational performance remains limited secondary to factors listed above and increased risk for falls and injury  From OT standpoint, recommendation at time of d/c would be Short Term Rehab  Patient to benefit from continued Occupational Therapy treatment while in the hospital to address deficits as defined above and maximize level of functional independence with ADLs and functional mobility   Pt left with call bell in reach, tray table in reach, needs met, chair alarm activated, SCDs on       OT Discharge Recommendation: Short Term Rehab  OT - OK to Discharge: Yes(when medically stable)

## 2019-10-09 NOTE — OCCUPATIONAL THERAPY NOTE
Occupational Therapy Treatment Note    Patient Name: Nicolás Barrera  SCLNB'A Date: 10/9/2019     10/09/19 1452   Restrictions/Precautions   Weight Bearing Precautions Per Order Yes   RLE Weight Bearing Per Order WBAT   Other Precautions Fall Risk; Chair Alarm  (Navarro)   General   Response to Previous Treatment Patient with no complaints from previous session   Lifestyle   Autonomy Per RN, pt pulled navarro out overnight last night and is currently on bladder irrigation  Pain Assessment   Pain Assessment Amezcua-Baker FACES   Amezcua-Baker FACES Pain Rating 4   Pain Location Hip   Pain Orientation Right   Hospital Pain Intervention(s) Repositioned; Ambulation/increased activity   Functional Standing Tolerance   Time 2min   Comments w RW and Min X2   Bed Mobility   Additional Comments Pt received OOB in recliner   Transfers   Sit to Stand 4  Minimal assistance   Additional items Assist x 2   Stand to Sit 4  Minimal assistance   Additional items Assist x 2;Verbal cues   Stand pivot 4  Minimal assistance   Additional items Assist x 2;Verbal cues   Cognition   Overall Cognitive Status [facility]   Arousal/Participation Alert; Cooperative   Attention Within functional limits   Orientation Level Oriented to person;Disoriented to place; Disoriented to situation;Disoriented to time   Memory Within functional limits   Following Commands Follows one step commands without difficulty   Comments RN reports he pulled his navarro out last night  Unsure if cognitive-related or accidental  Pt does not appear to have a change in mental status at this time     Activity Tolerance   Activity Tolerance Patient tolerated treatment well   Medical Staff Made Aware RASHEEDA Nolen   Assessment   Assessment Patient participated in Skilled OT session this date with interventions consisting of ADL re training with the use of correct body mechnaics, safety awareness and fall prevention techniques,  therapeutic activities to: increase activity tolerance and increase dynamic sit/ stand balance during functional activity    Patient agreeable to OT treatment session, upon arrival patient was found seated OOB to Recliner  Treatment session as follows: Pt on bladder irrigation but agreeable to sit to stand training  Pt requires VCs and Min X2 for sit to stand and stand to sit x2 trials  Pt able to stand 2 mins while linens/pad changed (pt with some bleeding 2* residual from pulling catheter out)  Pt assisted to don clean gown  Patient continues to be functioning below baseline level, occupational performance remains limited secondary to factors listed above and increased risk for falls and injury  From OT standpoint, recommendation at time of d/c would be Short Term Rehab  Patient to benefit from continued Occupational Therapy treatment while in the hospital to address deficits as defined above and maximize level of functional independence with ADLs and functional mobility  Pt left with call bell in reach, tray table in reach, needs met, chair alarm activated, SCDs on      Plan   OT Treatment Day 2   Recommendation   OT Discharge Recommendation Short Term Rehab   OT - OK to Discharge Yes  (when medically stable)     Rosalina Jackman, OT

## 2019-10-09 NOTE — PLAN OF CARE
Problem: Potential for Falls  Goal: Patient will remain free of falls  Description  INTERVENTIONS:  - Assess patient frequently for physical needs  -  Identify cognitive and physical deficits and behaviors that affect risk of falls    -  [place] fall precautions as indicated by assessment   - Educate patient/family on patient safety including physical limitations  - Instruct patient to call for assistance with activity based on assessment  - Modify environment to reduce risk of injury  - Consider OT/PT consult to assist with strengthening/mobility  Outcome: Progressing     Problem: Prexisting or High Potential for Compromised Skin Integrity  Goal: Skin integrity is maintained or improved  Description  INTERVENTIONS:  - Identify patients at risk for skin breakdown  - Assess and monitor skin integrity  - Assess and monitor nutrition and hydration status  - Monitor labs   - Assess for incontinence   - Turn and reposition patient  - Assist with mobility/ambulation  - Relieve pressure over bony prominences  - Avoid friction and shearing  - Provide appropriate hygiene as needed including keeping skin clean and dry  - Evaluate need for skin moisturizer/barrier cream  - Collaborate with interdisciplinary team   - Patient/family teaching  - Consider wound care consult   Outcome: Progressing     Problem: GENITOURINARY - ADULT  Goal: Maintains or returns to baseline urinary function  Description  INTERVENTIONS:  - Assess urinary function  - Encourage oral fluids to ensure adequate hydration if ordered  - Administer IV fluids as ordered to ensure adequate hydration  - Administer ordered medications as needed  - Offer frequent toileting  - Follow urinary retention protocol if ordered  Outcome: Progressing  Goal: Absence of urinary retention  Description  INTERVENTIONS:  - Assess patient's ability to void and empty bladder  - Monitor I/O  - Bladder scan as needed  - Discuss with physician/AP medications to alleviate retention as needed  - Discuss catheterization for long term situations as appropriate   Outcome: Progressing  Goal: Urinary catheter remains patent  Description  INTERVENTIONS:  - Assess patency of urinary catheter  - If patient has a chronic navarro, consider changing catheter if non-functioning  - Follow guidelines for intermittent irrigation of non-functioning urinary catheter  Outcome: Progressing     Problem: INFECTION - ADULT  Goal: Absence or prevention of progression during hospitalization  Description  INTERVENTIONS:  - Assess and monitor for signs and symptoms of infection  - Monitor lab/diagnostic results  - Monitor all insertion sites, i e  indwelling lines, tubes, and drains  - Monitor endotracheal if appropriate and nasal secretions for changes in amount and color  - [place] appropriate cooling/warming therapies per order  - Administer medications as ordered  - Instruct and encourage patient and family to use good hand hygiene technique  - Identify and instruct in appropriate isolation precautions for identified infection/condition  Outcome: Progressing  Goal: Absence of fever/infection during neutropenic period  Description  INTERVENTIONS:  - Monitor WBC    Outcome: Progressing     Problem: SAFETY ADULT  Goal: Maintain or return to baseline ADL function  Description  INTERVENTIONS:  -  Assess patient's ability to carry out ADLs; assess patient's baseline for ADL function and identify physical deficits which impact ability to perform ADLs (bathing, care of mouth/teeth, toileting, grooming, dressing, etc )  - Assess/evaluate cause of self-care deficits   - Assess range of motion  - Assess patient's mobility; develop plan if impaired  - Assess patient's need for assistive devices and provide as appropriate  - Encourage maximum independence but intervene and supervise when necessary  - Involve family in performance of ADLs  - Assess for home care needs following discharge   - Consider OT consult to assist with ADL evaluation and planning for discharge  - Provide patient education as appropriate  Outcome: Progressing  Goal: Maintain or return mobility status to optimal level  Description  INTERVENTIONS:  - Assess patient's baseline mobility status (ambulation, transfers, stairs, etc )    - Identify cognitive and physical deficits and behaviors that affect mobility  - Identify mobility aids required to assist with transfers and/or ambulation (gait belt, sit-to-stand, lift, walker, cane, etc )  - [place] fall precautions as indicated by assessment  - Record patient progress and toleration of activity level on Mobility SBAR; progress patient to next Phase/Stage  - Instruct patient to call for assistance with activity based on assessment  - Consider rehabilitation consult to assist with strengthening/weightbearing, etc   Outcome: Progressing     Problem: DISCHARGE PLANNING  Goal: Discharge to home or other facility with appropriate resources  Description  INTERVENTIONS:  - Identify barriers to discharge w/patient and caregiver  - Arrange for needed discharge resources and transportation as appropriate  - Identify discharge learning needs (meds, wound care, etc )  - Arrange for interpretive services to assist at discharge as needed  - Refer to Case Management Department for coordinating discharge planning if the patient needs post-hospital services based on physician/advanced practitioner order or complex needs related to functional status, cognitive ability, or social support system  Outcome: Progressing     Problem: Knowledge Deficit  Goal: Patient/family/caregiver demonstrates understanding of disease process, treatment plan, medications, and discharge instructions  Description  Complete learning assessment and assess knowledge base    Interventions:  - Provide teaching at level of understanding  - Provide teaching via preferred learning methods  Outcome: Progressing

## 2019-10-09 NOTE — ASSESSMENT & PLAN NOTE
Lab Results   Component Value Date    HGBA1C 6 7 (H) 10/07/2019       History of diabetes on metformin, glipizide, repaglinide and Canaglifozin    A1c 6 7  Blood sugar poorly controlled sliding scale, start units Lantus at bedtime  Resume glipizide  Continue with SSI, and Accucheks

## 2019-10-10 LAB
GLUCOSE SERPL-MCNC: 250 MG/DL (ref 65–140)
GLUCOSE SERPL-MCNC: 260 MG/DL (ref 65–140)
GLUCOSE SERPL-MCNC: 272 MG/DL (ref 65–140)
GLUCOSE SERPL-MCNC: 60 MG/DL (ref 65–140)

## 2019-10-10 PROCEDURE — 97530 THERAPEUTIC ACTIVITIES: CPT

## 2019-10-10 PROCEDURE — 97535 SELF CARE MNGMENT TRAINING: CPT

## 2019-10-10 PROCEDURE — 99232 SBSQ HOSP IP/OBS MODERATE 35: CPT | Performed by: PHYSICIAN ASSISTANT

## 2019-10-10 PROCEDURE — 97116 GAIT TRAINING THERAPY: CPT

## 2019-10-10 PROCEDURE — 99232 SBSQ HOSP IP/OBS MODERATE 35: CPT | Performed by: INTERNAL MEDICINE

## 2019-10-10 PROCEDURE — 82948 REAGENT STRIP/BLOOD GLUCOSE: CPT

## 2019-10-10 RX ORDER — INSULIN GLARGINE 100 [IU]/ML
5 INJECTION, SOLUTION SUBCUTANEOUS
Status: DISCONTINUED | OUTPATIENT
Start: 2019-10-10 | End: 2019-10-10

## 2019-10-10 RX ORDER — GLIPIZIDE 5 MG/1
10 TABLET ORAL
Status: DISCONTINUED | OUTPATIENT
Start: 2019-10-10 | End: 2019-10-11 | Stop reason: HOSPADM

## 2019-10-10 RX ADMIN — HEPARIN SODIUM 5000 UNITS: 5000 INJECTION INTRAVENOUS; SUBCUTANEOUS at 05:59

## 2019-10-10 RX ADMIN — HEPARIN SODIUM 5000 UNITS: 5000 INJECTION INTRAVENOUS; SUBCUTANEOUS at 14:05

## 2019-10-10 RX ADMIN — TAMSULOSIN HYDROCHLORIDE 0.4 MG: 0.4 CAPSULE ORAL at 08:07

## 2019-10-10 RX ADMIN — ATORVASTATIN CALCIUM 10 MG: 10 TABLET, FILM COATED ORAL at 08:06

## 2019-10-10 RX ADMIN — FINASTERIDE 5 MG: 5 TABLET, FILM COATED ORAL at 08:07

## 2019-10-10 RX ADMIN — HEPARIN SODIUM 5000 UNITS: 5000 INJECTION INTRAVENOUS; SUBCUTANEOUS at 21:58

## 2019-10-10 RX ADMIN — LISINOPRIL 10 MG: 10 TABLET ORAL at 08:06

## 2019-10-10 RX ADMIN — ACETAMINOPHEN 650 MG: 325 TABLET, FILM COATED ORAL at 12:36

## 2019-10-10 RX ADMIN — FUROSEMIDE 20 MG: 20 TABLET ORAL at 08:07

## 2019-10-10 RX ADMIN — GLIPIZIDE 10 MG: 5 TABLET ORAL at 16:52

## 2019-10-10 RX ADMIN — INSULIN LISPRO 3 UNITS: 100 INJECTION, SOLUTION INTRAVENOUS; SUBCUTANEOUS at 21:58

## 2019-10-10 RX ADMIN — ACETAMINOPHEN 650 MG: 325 TABLET, FILM COATED ORAL at 07:55

## 2019-10-10 RX ADMIN — TAMSULOSIN HYDROCHLORIDE 0.4 MG: 0.4 CAPSULE ORAL at 17:18

## 2019-10-10 RX ADMIN — MELATONIN 1000 UNITS: at 08:07

## 2019-10-10 RX ADMIN — ACETAMINOPHEN 650 MG: 325 TABLET, FILM COATED ORAL at 18:50

## 2019-10-10 NOTE — ASSESSMENT & PLAN NOTE
Lab Results   Component Value Date    HGBA1C 6 7 (H) 10/07/2019       · History of diabetes on metformin, glipizide, repaglinide and Canaglifozin  · A1c 6 7  · Given poorly-controlled blood sugars,10units Lantus was started yesterday, however his fasting blood sugars was 60, while his pre meal blood sugars range in the 230s to 260s  · Discontinue Lantus and resume 10mg Glipizide twice daily    · Continue with SSI, and Accucheks

## 2019-10-10 NOTE — PROGRESS NOTES
Progress Note - Joselo Bass 9/14/1927, 80 y o  male MRN: [id number]    Unit/Bed#: [address] [place] [address] Encounter: [id number]    Primary Care Provider: Neisha Trevizo MD   Date and time admitted to hospital: 10/6/2019  3:02 PM        * Ambulatory dysfunction  Assessment & Plan  Patient presenting with ambulatory dysfunction, found to have a right pelvic fracture      Plan  · Weight bearing as tolerated  · Patient was scheduled to leave for rehab 10/09/2019 but unfortunately pulled out his Tovar with resultant gross hematuria, hematuria now resolving on CBI and discharge planning is set for tomorrow morning  · Wean off CBI and observe for hematuria   · Hemoglobin remained stable at 11 8  ·  Urology on board  ·  Pain control with tylenol and lidocaine patch  · PT/OT on board  ·  on board  · Appreciate Ortho recommendations    Urinary retention due to benign prostatic hyperplasia  Assessment & Plan  Patient presenting with acute urinary retention, most likely secondary to BPH, status post Tovar catheter placement in the ER on 10/06/2019  · Urine clearing with CBI   · Weaning off CBI  · Continue to trend hemoglobin and plan for discharge tomorrow   · Urology on board  ·  Continue with home medications Flomax and finasteride    Closed displaced fracture of right pubis with routine healing  Assessment & Plan  Incidental finding of a closed nondisplaced fracture of the right pubic rami on CT scan pelvis, unable to determine etiology of fracture  Patient is a poor historian and does not recollect any recent falls, although he does have scattered mild bruises on his legs        Plan  - WBAT for now, and aim to discharge to rehab tomorrow  - Pt/OT  - DVT prophylaxis  - Pain control  - Ortho on board      Gross hematuria  Assessment & Plan  Gross hematuria, started after patient self removed his Tovar catheter overnight on 10/08/2019, Tovar catheter replaced but patient still with gross bloody urine   · Urine clearing, wean off CBI, urology on board  · Monitor hemoglobin  · Continue with DVT prophylaxis with Lovenox for now given recent pelvic fracture and risk of pulmonary embolism    Navarro catheter in place  Assessment & Plan  Patient presented with urinary retention, and Navarro was placed 10/06/2019 in the ER, self-removal of navarro 10/08/19, and reinsertion same day  · Gross hematuria cleared with CBI  · Wean off CBI and watch for hematuria  · Continue with trend Hb  · Transfuse if hemoglobin falls less than 7    Anemia  Assessment & Plan  Normocytic normochromic anemia, Hb 11 8, baseline 11 2 -12 7  · Iron levels low, ferritin, folate and B12 levels within normal limts, suggests anemia of chronic kidney disease  · No intervention currently, might EPO eventually  · Hemoglobin remains stable despite gross heamturia  · Keep Hb > 7    Chronic kidney disease (CKD), active medical management without dialysis, stage 3 (moderate) ([facility] [address] )  Assessment & Plan  Patient with history of chronic kidney disease, on admission, creatinine was elevated to 1 61, but GFR stayed the same  · Baseline creatinine 1 59   · Creatinine currently stable at 1 44  · Hydrate and trend BMP daily    Type 2 diabetes mellitus with stage 3 chronic kidney disease, without long-term current use of insulin ([facility] )  Assessment & Plan    Lab Results   Component Value Date    HGBA1C 6 7 (H) 10/07/2019       · History of diabetes on metformin, glipizide, repaglinide and Canaglifozin  · A1c 6 7  · Given poorly-controlled blood sugars,10units Lantus was started yesterday, however his fasting blood sugars was 60, while his pre meal blood sugars range in the 230s to 260s  · Discontinue Lantus and resume 10mg Glipizide twice daily  · Continue with SSI, and Accucheks      Hypertension  Assessment & Plan  History of hypertension on Lasix and quinapril at home         Plan  · Quinapril switched to lisinopril  · Blood pressure stable on lisinopril and Lasix  · Monitor blood pressure per unit protocol    Edema  Assessment & Plan  Chronic bilateral lower extremity pitting pedal edema, 3+ right greater than 2+ left, might be secondary to lymphatic obstruction  · Resolving, left leg no edema, right leg no edema  · Keep lower extremities elevated  · Observe for now, continue with home dose lasix      VTE Pharmacologic Prophylaxis:   Pharmacologic: Enoxaparin (Lovenox)  Mechanical VTE Prophylaxis in Place: Yes    Patient Centered Rounds: I have performed bedside rounds with nursing staff today  Discussions with Specialists or Other Care Team Provider:  Discussed with Urology    Education and Discussions with Family / Patient:  Discussed with patient as well as his POA/friend Gene    Time Spent for Care: 30 minutes  More than 50% of total time spent on counseling and coordination of care as described above  Current Length of Stay: 4 day(s)    Current Patient Status: Inpatient   Certification Statement: The patient will continue to require additional inpatient hospital stay due to Improving gross hematuria secondary to self removal of Tovar    Discharge Plan:  Tomorrow    Code Status: Level 1 - Full Code      Subjective:   No overnight events  Patient feels fine this morning    Objective:     Vitals:   Temp (24hrs), Av °F (36 7 °C), Min:97 6 °F (36 4 °C), Max:98 2 °F (36 8 °C)    Temp:  [97 6 °F (36 4 °C)-98 2 °F (36 8 °C)] 97 6 °F (36 4 °C)  HR:  [] 95  Resp:  [16-18] 16  BP: (100-138)/(56-68) 100/56  SpO2:  [96 %-97 %] 97 %  Body mass index is 19 07 kg/m²  Input and Output Summary (last 24 hours): Intake/Output Summary (Last 24 hours) at 10/10/2019 1514  Last data filed at 10/10/2019 0422  Gross per 24 hour   Intake    Output 250 ml   Net -250 ml       Physical Exam:     Physical Exam   Cardiovascular: Normal rate  Murmur heard  Pulmonary/Chest: Effort normal and breath sounds normal  He has no wheezes  He has no rales     Abdominal: Soft  Bowel sounds are normal  He exhibits no distension and no mass  There is no tenderness  There is no guarding  Tovar draining light red urine, CBI running   Musculoskeletal: He exhibits no edema  Neurological: He is alert  Additional Data:     Labs:    Results from last 7 days   Lab Units 10/09/19  0704  10/06/19  1527   WBC Thousand/uL 8 50   < > 6 57   HEMOGLOBIN g/dL 11 8*   < > 11 8*   HEMATOCRIT % 37 0   < > 36 0*   PLATELETS Thousands/uL 188   < > 184   NEUTROS PCT %  --   --  70   LYMPHS PCT %  --   --  16   MONOS PCT %  --   --  13*   EOS PCT %  --   --  1    < > = values in this interval not displayed  Results from last 7 days   Lab Units 10/09/19  0540  10/06/19  1527   SODIUM mmol/L 138   < > 139   POTASSIUM mmol/L 4 2   < > 4 2   CHLORIDE mmol/L 104   < > 102   CO2 mmol/L 23   < > 29   BUN mg/dL 40*   < > 41*   CREATININE mg/dL 1 44*   < > 1 61*   ANION GAP mmol/L 11   < > 8   CALCIUM mg/dL 9 8   < > 9 7   ALBUMIN g/dL  --   --  3 6   TOTAL BILIRUBIN mg/dL  --   --  0 40   ALK PHOS U/L  --   --  100   ALT U/L  --   --  22   AST U/L  --   --  21   GLUCOSE RANDOM mg/dL 167*   < > 111    < > = values in this interval not displayed  Results from last 7 days   Lab Units 10/10/19  1147 10/10/19  0736 10/09/19  2100 10/09/19  1633 10/09/19  1117 10/09/19  0737 10/08/19  2127 10/08/19  1618 10/08/19  1113 10/08/19  0736 10/07/19  2118 10/07/19  1602   POC GLUCOSE mg/dl 260* 60* 257* 233* 311* 159* 231* 245* 210* 143* 160* 171*     Results from last 7 days   Lab Units 10/07/19  0504   HEMOGLOBIN A1C % 6 7*               * I Have Reviewed All Lab Data Listed Above  * Additional Pertinent Lab Tests Reviewed:  All Labs Within Last 24 Hours Reviewed    Imaging:    Imaging Reports Reviewed Today Include:   Imaging Personally Reviewed by Myself Includes:      Recent Cultures (last 7 days):           Last 24 Hours Medication List:     Current Facility-Administered Medications:  acetaminophen 650 mg Oral Q6H Mathieu Diggs MD   atorvastatin 10 mg Oral Daily Mathieu Diggs, MD   cholecalciferol 1,000 Units Oral Daily Mathieu Diggs, MD   finasteride 5 mg Oral Daily Mathieu Diggs, MD   furosemide 20 mg Oral Daily Mathieu Diggs, MD   glipiZIDE 10 mg Oral BID AC Mathieu Diggs MD   heparin (porcine) 5,000 Units Subcutaneous Q8H [address] Mathieu Diggs MD   insulin lispro 1-5 Units Subcutaneous HS Mathieu Diggs MD   insulin lispro 1-5 Units Subcutaneous TID AC Mathieu Diggs MD   lisinopril 10 mg Oral Daily Mathieu Diggs, MD   tamsulosin 0 4 mg Oral BID Kassi Dc PA-C        Today, Patient Was Seen By: Mathieu Diggs MD    ** Please Note: Dictation voice to text software may have been used in the creation of this document   **

## 2019-10-10 NOTE — ASSESSMENT & PLAN NOTE
Normocytic normochromic anemia, Hb 11 8, baseline 11 2 -12 7  · Iron levels low, ferritin, folate and B12 levels within normal limts, suggests anemia of chronic kidney disease  · No intervention currently, might EPO eventually  · Hemoglobin remains stable despite gross heamturia  · Keep Hb > 7

## 2019-10-10 NOTE — ASSESSMENT & PLAN NOTE
Gross hematuria, started after patient self removed his Tovar catheter overnight on 10/08/2019, Tovar catheter replaced but patient still with gross bloody urine    · Urine clearing, wean off CBI, urology on board  · Monitor hemoglobin  · Continue with DVT prophylaxis with Lovenox for now given recent pelvic fracture and risk of pulmonary embolism

## 2019-10-10 NOTE — PLAN OF CARE
Problem: Potential for Falls  Goal: Patient will remain free of falls  Description  INTERVENTIONS:  - Assess patient frequently for physical needs  -  Identify cognitive and physical deficits and behaviors that affect risk of falls    -  [place] fall precautions as indicated by assessment   - Educate patient/family on patient safety including physical limitations  - Instruct patient to call for assistance with activity based on assessment  - Modify environment to reduce risk of injury  - Consider OT/PT consult to assist with strengthening/mobility  Outcome: Progressing     Problem: Prexisting or High Potential for Compromised Skin Integrity  Goal: Skin integrity is maintained or improved  Description  INTERVENTIONS:  - Identify patients at risk for skin breakdown  - Assess and monitor skin integrity  - Assess and monitor nutrition and hydration status  - Monitor labs   - Assess for incontinence   - Turn and reposition patient  - Assist with mobility/ambulation  - Relieve pressure over bony prominences  - Avoid friction and shearing  - Provide appropriate hygiene as needed including keeping skin clean and dry  - Evaluate need for skin moisturizer/barrier cream  - Collaborate with interdisciplinary team   - Patient/family teaching  - Consider wound care consult   Outcome: Progressing     Problem: GENITOURINARY - ADULT  Goal: Maintains or returns to baseline urinary function  Description  INTERVENTIONS:  - Assess urinary function  - Encourage oral fluids to ensure adequate hydration if ordered  - Administer IV fluids as ordered to ensure adequate hydration  - Administer ordered medications as needed  - Offer frequent toileting  - Follow urinary retention protocol if ordered  Outcome: Progressing  Goal: Absence of urinary retention  Description  INTERVENTIONS:  - Assess patient's ability to void and empty bladder  - Monitor I/O  - Bladder scan as needed  - Discuss with physician/AP medications to alleviate retention as needed  - Discuss catheterization for long term situations as appropriate   Outcome: Progressing  Goal: Urinary catheter remains patent  Description  INTERVENTIONS:  - Assess patency of urinary catheter  - If patient has a chronic navarro, consider changing catheter if non-functioning  - Follow guidelines for intermittent irrigation of non-functioning urinary catheter  Outcome: Progressing     Problem: INFECTION - ADULT  Goal: Absence or prevention of progression during hospitalization  Description  INTERVENTIONS:  - Assess and monitor for signs and symptoms of infection  - Monitor lab/diagnostic results  - Monitor all insertion sites, i e  indwelling lines, tubes, and drains  - Monitor endotracheal if appropriate and nasal secretions for changes in amount and color  - [place] appropriate cooling/warming therapies per order  - Administer medications as ordered  - Instruct and encourage patient and family to use good hand hygiene technique  - Identify and instruct in appropriate isolation precautions for identified infection/condition  Outcome: Progressing  Goal: Absence of fever/infection during neutropenic period  Description  INTERVENTIONS:  - Monitor WBC    Outcome: Progressing     Problem: SAFETY ADULT  Goal: Maintain or return to baseline ADL function  Description  INTERVENTIONS:  -  Assess patient's ability to carry out ADLs; assess patient's baseline for ADL function and identify physical deficits which impact ability to perform ADLs (bathing, care of mouth/teeth, toileting, grooming, dressing, etc )  - Assess/evaluate cause of self-care deficits   - Assess range of motion  - Assess patient's mobility; develop plan if impaired  - Assess patient's need for assistive devices and provide as appropriate  - Encourage maximum independence but intervene and supervise when necessary  - Involve family in performance of ADLs  - Assess for home care needs following discharge   - Consider OT consult to assist with ADL evaluation and planning for discharge  - Provide patient education as appropriate  Outcome: Progressing  Goal: Maintain or return mobility status to optimal level  Description  INTERVENTIONS:  - Assess patient's baseline mobility status (ambulation, transfers, stairs, etc )    - Identify cognitive and physical deficits and behaviors that affect mobility  - Identify mobility aids required to assist with transfers and/or ambulation (gait belt, sit-to-stand, lift, walker, cane, etc )  - [place] fall precautions as indicated by assessment  - Record patient progress and toleration of activity level on Mobility SBAR; progress patient to next Phase/Stage  - Instruct patient to call for assistance with activity based on assessment  - Consider rehabilitation consult to assist with strengthening/weightbearing, etc   Outcome: Progressing     Problem: DISCHARGE PLANNING  Goal: Discharge to home or other facility with appropriate resources  Description  INTERVENTIONS:  - Identify barriers to discharge w/patient and caregiver  - Arrange for needed discharge resources and transportation as appropriate  - Identify discharge learning needs (meds, wound care, etc )  - Arrange for interpretive services to assist at discharge as needed  - Refer to Case Management Department for coordinating discharge planning if the patient needs post-hospital services based on physician/advanced practitioner order or complex needs related to functional status, cognitive ability, or social support system  Outcome: Progressing     Problem: Knowledge Deficit  Goal: Patient/family/caregiver demonstrates understanding of disease process, treatment plan, medications, and discharge instructions  Description  Complete learning assessment and assess knowledge base    Interventions:  - Provide teaching at level of understanding  - Provide teaching via preferred learning methods  Outcome: Progressing

## 2019-10-10 NOTE — PROGRESS NOTES
Progress Note - General Surgery  [facility] Prema 80 y o  male MRN: [id number]  Unit/Bed#: [address] [place] [address] [id number] Encounter: [id number]    Assessment/Plan:  Urinary rentention  Following fall and pubic ramus fracture with ambulatory dysfunction  Navarro dislodged by patient on 10/8, replaced with 3 way and CBI running yesterday for prema blood via penis  CBI turned off this am, urine light punch colored  Continue navarro catheter at discharge with void trial as outpt      Subjective/Objective   Chief Complaint: patient without complaints    Subjective: Severe dementia    Objective:     Blood pressure 138/62, pulse 101, temperature 97 8 °F (36 6 °C), temperature source Axillary, resp  rate 18, height 5' 6" (1 676 m), weight 53 6 kg (118 lb 2 7 oz), SpO2 96 %  ,Body mass index is 19 07 kg/m²  Intake/Output Summary (Last 24 hours) at 10/10/2019 1027  Last data filed at 10/10/2019 0422  Gross per 24 hour   Intake    Output 650 ml   Net -650 ml       Invasive Devices     Peripheral Intravenous Line            Peripheral IV 10/10/19 Right;Ventral (anterior) Forearm less than 1 day          Drain            Urethral Catheter Double-lumen 16 Fr  1 day    Continuous Bladder Irrigation Three-way less than 1 day                Physical Exam: /62   Pulse 101   Temp 97 8 °F (36 6 °C) (Axillary)   Resp 18   Ht 5' 6" (1 676 m)   Wt 53 6 kg (118 lb 2 7 oz)   SpO2 96%   BMI 19 07 kg/m²   General appearance: alert and oriented, in no acute distress and sleeping in bed  Abdomen: soft, non-tender, non-distended  Navarro: in place with flow of light punch colored urine      Lab, Imaging and other studies:CBC: No results found for: WBC, HGB, HCT, MCV, PLT, ADJUSTEDWBC, MCH, MCHC, RDW, MPV, NRBC, CMP: No results found for: SODIUM, K, CL, CO2, ANIONGAP, BUN, CREATININE, GLUCOSE, CALCIUM, AST, ALT, ALKPHOS, PROT, BILITOT, EGFR  VTE Pharmacologic Prophylaxis: Heparin  VTE Mechanical Prophylaxis: sequential compression device

## 2019-10-10 NOTE — ASSESSMENT & PLAN NOTE
Patient presenting with ambulatory dysfunction, found to have a right pelvic fracture      Plan  · Weight bearing as tolerated  · Patient was scheduled to leave for rehab 10/09/2019 but unfortunately pulled out his Tovar with resultant gross hematuria, hematuria now resolving on CBI and discharge planning is set for tomorrow morning    · Wean off CBI and observe for hematuria   · Hemoglobin remained stable at 11 8  ·  Urology on board  ·  Pain control with tylenol and lidocaine patch  · PT/OT on board  ·  on board  · Appreciate Ortho recommendations

## 2019-10-10 NOTE — PLAN OF CARE
Problem: OCCUPATIONAL THERAPY ADULT  Goal: Performs self-care activities at highest level of function for planned discharge setting  See evaluation for individualized goals  Outcome: Progressing  Note:   Limitation: Decreased ADL status, Decreased endurance, Decreased self-care trans  Prognosis: Fair  Assessment: Patient participated in Skilled OT session this date with interventions consisting of ADL re training with the use of correct body mechnaics, safety awareness and fall prevention techniques,  therapeutic activities to: increase activity tolerance and increase dynamic sit/ stand balance during functional activity    Patient agreeable to OT treatment session, upon arrival patient was found supine in bed  Treatment session as follows: Pt sat EOB for ADL  Pt able to assist with uppers, dependent for lowers  Pt required less assist to stand today, Min A x1  And transferred to recliner chair with Min A x2 and RW  Patient continues to be functioning below baseline level, occupational performance remains limited secondary to factors listed above and increased risk for falls and injury  From OT standpoint, recommendation at time of d/c would be Short Term Rehab  Patient to benefit from continued Occupational Therapy treatment while in the hospital to address deficits as defined above and maximize level of functional independence with ADLs and functional mobility  Pt left with PCA to finish navarro care       OT Discharge Recommendation: Short Term Rehab  OT - OK to Discharge: Yes(once medically stable to STR)

## 2019-10-10 NOTE — ASSESSMENT & PLAN NOTE
Incidental finding of a closed nondisplaced fracture of the right pubic rami on CT scan pelvis, unable to determine etiology of fracture  Patient is a poor historian and does not recollect any recent falls, although he does have scattered mild bruises on his legs        Plan  - WBAT for now, and aim to discharge to rehab tomorrow  - Pt/OT  - DVT prophylaxis  - Pain control  - Ortho on board

## 2019-10-10 NOTE — ASSESSMENT & PLAN NOTE
Patient presenting with acute urinary retention, most likely secondary to BPH, status post Tovar catheter placement in the ER on 10/06/2019  · Urine clearing with CBI   · Weaning off CBI  · Continue to trend hemoglobin and plan for discharge tomorrow   · Urology on board  ·  Continue with home medications Flomax and finasteride

## 2019-10-10 NOTE — OCCUPATIONAL THERAPY NOTE
Occupational Therapy Treatment Note    Patient Name: Eloy Coulter  JAVXW'R Date: 10/10/2019     10/10/19 1039   Restrictions/Precautions   Weight Bearing Precautions Per Order No   RLE Weight Bearing Per Order WBAT   Other Precautions Fall Risk; Chair Alarm  (Navarro)   General   Response to Previous Treatment Patient with no complaints from previous session   Pain Assessment   Pain Assessment No/denies pain   ADL   Grooming Assistance 5  Supervision/Setup   Grooming Deficit Setup; Wash/dry face   UB Bathing Assistance 5  Supervision/Setup   UB Bathing Deficit Right arm;Left arm; Abdomen; Chest   LB Bathing Assistance 2  Maximal Assistance   LB Bathing Deficit Perineal area; Buttocks   UB Dressing Assistance 4  Minimal Assistance   UB Dressing Deficit Thread RUE; Thread LUE; Fasteners   LB Dressing Assistance 2  Maximal Assistance   LB Dressing Deficit Don/doff R sock; Don/doff L sock   Toileting Comments Pt has navarro catheter   Functional Standing Tolerance   Time 1min x2   Activity ADL (mee hygiene)   Comments w RW and Min Ax1   Bed Mobility   Supine to Sit 4  Minimal assistance   Additional items Assist x 2   Transfers   Sit to Stand 4  Minimal assistance   Additional items Assist x 1   Stand to Sit 4  Minimal assistance   Additional items Assist x 1   Stand pivot 4  Minimal assistance   Additional items Assist x 2;Verbal cues  (RW)   Cognition   Overall Cognitive Status [facility]   Arousal/Participation Alert; Cooperative   Attention Within functional limits   Orientation Level Oriented to person;Oriented to place; Disoriented to time;Disoriented to situation   Memory Within functional limits   Following Commands Follows one step commands with increased time or repetition   Activity Tolerance   Activity Tolerance Patient tolerated treatment well   Medical Staff Made Aware RASHEEDA Dennis, PT Mary   Assessment   Assessment Patient participated in Skilled OT session this date with interventions consisting of ADL re training with the use of correct body mechnaics, safety awareness and fall prevention techniques,  therapeutic activities to: increase activity tolerance and increase dynamic sit/ stand balance during functional activity    Patient agreeable to OT treatment session, upon arrival patient was found supine in bed  Treatment session as follows: Pt sat EOB for ADL  Pt able to assist with uppers, dependent for lowers  Pt required less assist to stand today, Min A x1  And transferred to recliner chair with Min A x2 and RW  Patient continues to be functioning below baseline level, occupational performance remains limited secondary to factors listed above and increased risk for falls and injury  From OT standpoint, recommendation at time of d/c would be Short Term Rehab  Patient to benefit from continued Occupational Therapy treatment while in the hospital to address deficits as defined above and maximize level of functional independence with ADLs and functional mobility  Pt left with PCA to finish navarro care     Plan   OT Treatment Day 3   Recommendation   OT Discharge Recommendation Short Term Rehab   OT - OK to Discharge Yes  (once medically stable to STR)     Jonathan Siddiqui, OT

## 2019-10-10 NOTE — ASSESSMENT & PLAN NOTE
Chronic bilateral lower extremity pitting pedal edema, 3+ right greater than 2+ left, might be secondary to lymphatic obstruction  · Resolving, left leg no edema, right leg no edema  · Keep lower extremities elevated  · Observe for now, continue with home dose lasix

## 2019-10-10 NOTE — ASSESSMENT & PLAN NOTE
Patient presented with urinary retention, and Navarro was placed 10/06/2019 in the ER, self-removal of navarro 10/08/19, and reinsertion same day  · Gross hematuria cleared with CBI  · Wean off CBI and watch for hematuria  · Continue with trend Hb  · Transfuse if hemoglobin falls less than 7

## 2019-10-11 VITALS
BODY MASS INDEX: 18.99 KG/M2 | HEIGHT: 66 IN | RESPIRATION RATE: 16 BRPM | TEMPERATURE: 98.6 F | SYSTOLIC BLOOD PRESSURE: 101 MMHG | HEART RATE: 83 BPM | DIASTOLIC BLOOD PRESSURE: 50 MMHG | WEIGHT: 118.17 LBS | OXYGEN SATURATION: 94 %

## 2019-10-11 LAB
GLUCOSE SERPL-MCNC: 140 MG/DL (ref 65–140)
GLUCOSE SERPL-MCNC: 365 MG/DL (ref 65–140)

## 2019-10-11 PROCEDURE — 82948 REAGENT STRIP/BLOOD GLUCOSE: CPT

## 2019-10-11 PROCEDURE — 99239 HOSP IP/OBS DSCHRG MGMT >30: CPT | Performed by: INTERNAL MEDICINE

## 2019-10-11 RX ORDER — ACETAMINOPHEN 325 MG/1
650 TABLET ORAL EVERY 6 HOURS
Qty: 30 TABLET | Refills: 0 | Status: SHIPPED | OUTPATIENT
Start: 2019-10-11

## 2019-10-11 RX ADMIN — TAMSULOSIN HYDROCHLORIDE 0.4 MG: 0.4 CAPSULE ORAL at 08:03

## 2019-10-11 RX ADMIN — ACETAMINOPHEN 650 MG: 325 TABLET, FILM COATED ORAL at 01:45

## 2019-10-11 RX ADMIN — ATORVASTATIN CALCIUM 10 MG: 10 TABLET, FILM COATED ORAL at 08:03

## 2019-10-11 RX ADMIN — MELATONIN 1000 UNITS: at 08:03

## 2019-10-11 RX ADMIN — ACETAMINOPHEN 650 MG: 325 TABLET, FILM COATED ORAL at 06:42

## 2019-10-11 RX ADMIN — GLIPIZIDE 10 MG: 5 TABLET ORAL at 06:42

## 2019-10-11 RX ADMIN — FINASTERIDE 5 MG: 5 TABLET, FILM COATED ORAL at 08:03

## 2019-10-11 RX ADMIN — HEPARIN SODIUM 5000 UNITS: 5000 INJECTION INTRAVENOUS; SUBCUTANEOUS at 06:42

## 2019-10-11 NOTE — PLAN OF CARE
Problem: Potential for Falls  Goal: Patient will remain free of falls  Description  INTERVENTIONS:  - Assess patient frequently for physical needs  -  Identify cognitive and physical deficits and behaviors that affect risk of falls    -  [place] fall precautions as indicated by assessment   - Educate patient/family on patient safety including physical limitations  - Instruct patient to call for assistance with activity based on assessment  - Modify environment to reduce risk of injury  - Consider OT/PT consult to assist with strengthening/mobility  10/11/2019 1311 by Kalli Nguyễn RN  Outcome: Adequate for Discharge  10/11/2019 0729 by Kalli Nugyễn RN  Outcome: Progressing     Problem: Prexisting or High Potential for Compromised Skin Integrity  Goal: Skin integrity is maintained or improved  Description  INTERVENTIONS:  - Identify patients at risk for skin breakdown  - Assess and monitor skin integrity  - Assess and monitor nutrition and hydration status  - Monitor labs   - Assess for incontinence   - Turn and reposition patient  - Assist with mobility/ambulation  - Relieve pressure over bony prominences  - Avoid friction and shearing  - Provide appropriate hygiene as needed including keeping skin clean and dry  - Evaluate need for skin moisturizer/barrier cream  - Collaborate with interdisciplinary team   - Patient/family teaching  - Consider wound care consult   10/11/2019 1311 by Kalli Nguyễn RN  Outcome: Adequate for Discharge  10/11/2019 0729 by Kalli Nguyễn RN  Outcome: Progressing     Problem: GENITOURINARY - ADULT  Goal: Maintains or returns to baseline urinary function  Description  INTERVENTIONS:  - Assess urinary function  - Encourage oral fluids to ensure adequate hydration if ordered  - Administer IV fluids as ordered to ensure adequate hydration  - Administer ordered medications as needed  - Offer frequent toileting  - Follow urinary retention protocol if ordered  10/11/2019 1311 by Nicci Ramachandran RN  Outcome: Adequate for Discharge  10/11/2019 0729 by Nicci Ramachandran RN  Outcome: Progressing  Goal: Absence of urinary retention  Description  INTERVENTIONS:  - Assess patient's ability to void and empty bladder  - Monitor I/O  - Bladder scan as needed  - Discuss with physician/AP medications to alleviate retention as needed  - Discuss catheterization for long term situations as appropriate   10/11/2019 1311 by Nicci Ramachandran RN  Outcome: Adequate for Discharge  10/11/2019 0729 by Nicci Ramachandran RN  Outcome: Progressing  Goal: Urinary catheter remains patent  Description  INTERVENTIONS:  - Assess patency of urinary catheter  - If patient has a chronic navarro, consider changing catheter if non-functioning  - Follow guidelines for intermittent irrigation of non-functioning urinary catheter  10/11/2019 1311 by Nicci Ramachandran RN  Outcome: Adequate for Discharge  10/11/2019 0729 by Nicci Ramachandran RN  Outcome: Progressing     Problem: INFECTION - ADULT  Goal: Absence or prevention of progression during hospitalization  Description  INTERVENTIONS:  - Assess and monitor for signs and symptoms of infection  - Monitor lab/diagnostic results  - Monitor all insertion sites, i e  indwelling lines, tubes, and drains  - Monitor endotracheal if appropriate and nasal secretions for changes in amount and color  - [place] appropriate cooling/warming therapies per order  - Administer medications as ordered  - Instruct and encourage patient and family to use good hand hygiene technique  - Identify and instruct in appropriate isolation precautions for identified infection/condition  10/11/2019 1311 by Nicci Ramachandran RN  Outcome: Adequate for Discharge  10/11/2019 0729 by Nicci Ramachandran RN  Outcome: Progressing  Goal: Absence of fever/infection during neutropenic period  Description  INTERVENTIONS:  - Monitor WBC    10/11/2019 1311 by Nicci Ramachandran RN  Outcome: Adequate for Discharge  10/11/2019 0729 by Nicci Ramachandran RN  Outcome: Progressing     Problem: SAFETY ADULT  Goal: Maintain or return to baseline ADL function  Description  INTERVENTIONS:  -  Assess patient's ability to carry out ADLs; assess patient's baseline for ADL function and identify physical deficits which impact ability to perform ADLs (bathing, care of mouth/teeth, toileting, grooming, dressing, etc )  - Assess/evaluate cause of self-care deficits   - Assess range of motion  - Assess patient's mobility; develop plan if impaired  - Assess patient's need for assistive devices and provide as appropriate  - Encourage maximum independence but intervene and supervise when necessary  - Involve family in performance of ADLs  - Assess for home care needs following discharge   - Consider OT consult to assist with ADL evaluation and planning for discharge  - Provide patient education as appropriate  10/11/2019 1311 by Nicci Ramachandran RN  Outcome: Adequate for Discharge  10/11/2019 0729 by Nicci Ramachandran RN  Outcome: Progressing  Goal: Maintain or return mobility status to optimal level  Description  INTERVENTIONS:  - Assess patient's baseline mobility status (ambulation, transfers, stairs, etc )    - Identify cognitive and physical deficits and behaviors that affect mobility  - Identify mobility aids required to assist with transfers and/or ambulation (gait belt, sit-to-stand, lift, walker, cane, etc )  - [place] fall precautions as indicated by assessment  - Record patient progress and toleration of activity level on Mobility SBAR; progress patient to next Phase/Stage  - Instruct patient to call for assistance with activity based on assessment  - Consider rehabilitation consult to assist with strengthening/weightbearing, etc   10/11/2019 1311 by Nicci Ramachandran RN  Outcome: Adequate for Discharge  10/11/2019 0729 by Nicci Ramachandran RN  Outcome: Progressing     Problem: DISCHARGE PLANNING  Goal: Discharge to home or other facility with appropriate resources  Description  INTERVENTIONS:  - Identify barriers to discharge w/patient and caregiver  - Arrange for needed discharge resources and transportation as appropriate  - Identify discharge learning needs (meds, wound care, etc )  - Arrange for interpretive services to assist at discharge as needed  - Refer to Case Management Department for coordinating discharge planning if the patient needs post-hospital services based on physician/advanced practitioner order or complex needs related to functional status, cognitive ability, or social support system  10/11/2019 1311 by Alexandru Rosas RN  Outcome: Adequate for Discharge  10/11/2019 0729 by Alexandru Rosas RN  Outcome: Progressing     Problem: Knowledge Deficit  Goal: Patient/family/caregiver demonstrates understanding of disease process, treatment plan, medications, and discharge instructions  Description  Complete learning assessment and assess knowledge base    Interventions:  - Provide teaching at level of understanding  - Provide teaching via preferred learning methods  10/11/2019 1311 by Alexandru Rosas RN  Outcome: Adequate for Discharge  10/11/2019 0729 by Alexandru Rosas RN  Outcome: Progressing

## 2019-10-11 NOTE — PLAN OF CARE
Problem: Potential for Falls  Goal: Patient will remain free of falls  Description  INTERVENTIONS:  - Assess patient frequently for physical needs  -  Identify cognitive and physical deficits and behaviors that affect risk of falls    -  [place] fall precautions as indicated by assessment   - Educate patient/family on patient safety including physical limitations  - Instruct patient to call for assistance with activity based on assessment  - Modify environment to reduce risk of injury  - Consider OT/PT consult to assist with strengthening/mobility  Outcome: Progressing     Problem: Prexisting or High Potential for Compromised Skin Integrity  Goal: Skin integrity is maintained or improved  Description  INTERVENTIONS:  - Identify patients at risk for skin breakdown  - Assess and monitor skin integrity  - Assess and monitor nutrition and hydration status  - Monitor labs   - Assess for incontinence   - Turn and reposition patient  - Assist with mobility/ambulation  - Relieve pressure over bony prominences  - Avoid friction and shearing  - Provide appropriate hygiene as needed including keeping skin clean and dry  - Evaluate need for skin moisturizer/barrier cream  - Collaborate with interdisciplinary team   - Patient/family teaching  - Consider wound care consult   Outcome: Progressing     Problem: GENITOURINARY - ADULT  Goal: Maintains or returns to baseline urinary function  Description  INTERVENTIONS:  - Assess urinary function  - Encourage oral fluids to ensure adequate hydration if ordered  - Administer IV fluids as ordered to ensure adequate hydration  - Administer ordered medications as needed  - Offer frequent toileting  - Follow urinary retention protocol if ordered  Outcome: Progressing  Goal: Absence of urinary retention  Description  INTERVENTIONS:  - Assess patient's ability to void and empty bladder  - Monitor I/O  - Bladder scan as needed  - Discuss with physician/AP medications to alleviate retention as needed  - Discuss catheterization for long term situations as appropriate   Outcome: Progressing  Goal: Urinary catheter remains patent  Description  INTERVENTIONS:  - Assess patency of urinary catheter  - If patient has a chronic navarro, consider changing catheter if non-functioning  - Follow guidelines for intermittent irrigation of non-functioning urinary catheter  Outcome: Progressing     Problem: INFECTION - ADULT  Goal: Absence or prevention of progression during hospitalization  Description  INTERVENTIONS:  - Assess and monitor for signs and symptoms of infection  - Monitor lab/diagnostic results  - Monitor all insertion sites, i e  indwelling lines, tubes, and drains  - Monitor endotracheal if appropriate and nasal secretions for changes in amount and color  - [place] appropriate cooling/warming therapies per order  - Administer medications as ordered  - Instruct and encourage patient and family to use good hand hygiene technique  - Identify and instruct in appropriate isolation precautions for identified infection/condition  Outcome: Progressing  Goal: Absence of fever/infection during neutropenic period  Description  INTERVENTIONS:  - Monitor WBC    Outcome: Progressing     Problem: SAFETY ADULT  Goal: Maintain or return to baseline ADL function  Description  INTERVENTIONS:  -  Assess patient's ability to carry out ADLs; assess patient's baseline for ADL function and identify physical deficits which impact ability to perform ADLs (bathing, care of mouth/teeth, toileting, grooming, dressing, etc )  - Assess/evaluate cause of self-care deficits   - Assess range of motion  - Assess patient's mobility; develop plan if impaired  - Assess patient's need for assistive devices and provide as appropriate  - Encourage maximum independence but intervene and supervise when necessary  - Involve family in performance of ADLs  - Assess for home care needs following discharge   - Consider OT consult to assist with ADL evaluation and planning for discharge  - Provide patient education as appropriate  Outcome: Progressing  Goal: Maintain or return mobility status to optimal level  Description  INTERVENTIONS:  - Assess patient's baseline mobility status (ambulation, transfers, stairs, etc )    - Identify cognitive and physical deficits and behaviors that affect mobility  - Identify mobility aids required to assist with transfers and/or ambulation (gait belt, sit-to-stand, lift, walker, cane, etc )  - [place] fall precautions as indicated by assessment  - Record patient progress and toleration of activity level on Mobility SBAR; progress patient to next Phase/Stage  - Instruct patient to call for assistance with activity based on assessment  - Consider rehabilitation consult to assist with strengthening/weightbearing, etc   Outcome: Progressing     Problem: DISCHARGE PLANNING  Goal: Discharge to home or other facility with appropriate resources  Description  INTERVENTIONS:  - Identify barriers to discharge w/patient and caregiver  - Arrange for needed discharge resources and transportation as appropriate  - Identify discharge learning needs (meds, wound care, etc )  - Arrange for interpretive services to assist at discharge as needed  - Refer to Case Management Department for coordinating discharge planning if the patient needs post-hospital services based on physician/advanced practitioner order or complex needs related to functional status, cognitive ability, or social support system  Outcome: Progressing     Problem: Knowledge Deficit  Goal: Patient/family/caregiver demonstrates understanding of disease process, treatment plan, medications, and discharge instructions  Description  Complete learning assessment and assess knowledge base    Interventions:  - Provide teaching at level of understanding  - Provide teaching via preferred learning methods  Outcome: Progressing

## 2019-10-11 NOTE — NURSING NOTE
Patient discharged to [facility]  Gave report to Pancho Deleon notified patient was discharged with IV

## 2019-10-11 NOTE — ASSESSMENT & PLAN NOTE
Gross hematuria, started after patient self removed his Tovar catheter overnight on 10/08/2019, Tovar catheter replaced but patient still with gross bloody urine    · Urine clear  · Monitor hemoglobin  · Continue with DVT prophylaxis with Lovenox for now given recent pelvic fracture and risk of pulmonary embolism

## 2019-10-11 NOTE — PLAN OF CARE
Problem: PHYSICAL THERAPY ADULT  Goal: Performs mobility at highest level of function for planned discharge setting  See evaluation for individualized goals  Description  Treatment/Interventions: ADL retraining, Functional transfer training, LE strengthening/ROM, Therapeutic exercise, Endurance training, Patient/family training, Equipment eval/education, Bed mobility, Gait training, Spoke to nursing, Spoke to case management, OT  Equipment Recommended: Christofer Reason       See flowsheet documentation for full assessment, interventions and recommendations  Outcome: Progressing  Note:   Prognosis: Good  Problem List: Decreased endurance, Impaired balance, Decreased mobility, Decreased safety awareness, Orthopedic restrictions  Assessment: PT able to mobilize oob to chair and short distance amb alexandru rw and 2 asisst  Continues with melanie  Hope to go to rehab soon  Barriers to Discharge: (medicla status)     Recommendation: Short-term skilled PT     PT - OK to Discharge: Yes    See flowsheet documentation for full assessment

## 2019-10-11 NOTE — ASSESSMENT & PLAN NOTE
Patient presented with urinary retention, and Navarro was placed 10/06/2019 in the ER, self-removal of navarro 10/08/19, and reinsertion same day  · Gross hematuria cleared with CBI  · Patient is off CBI and his urine is clear and yellow   · Transfuse if hemoglobin falls less than 7

## 2019-10-11 NOTE — ASSESSMENT & PLAN NOTE
History of hypertension on Lasix and quinapril at home         Plan  · Quinapril switched to lisinopril  · Can be discharged today on home meds

## 2019-10-11 NOTE — DISCHARGE SUMMARY
Discharge- Sharyn Stiles 9/14/1927, 80 y o  male MRN: [id number]    Unit/Bed#: [address] [place] [address] Encounter: [id number]    Primary Care Provider: Emily Moscoso MD   Date and time admitted to hospital: 10/6/2019  3:02 PM        * Ambulatory dysfunction  Assessment & Plan  Patient presenting with ambulatory dysfunction, found to have a right pelvic fracture      Plan  · Weight bearing as tolerated  · Patient is medically stable for discharge to rehab today   · Now off CBI, urine clear and no hematuria   · Hemoglobin stable  ·  Urology on board, to follow up outpatient  ·  Pain control with tylenol and lidocaine patch  · PT/OT on board  ·  on board  · Appreciate Ortho recommendations    Urinary retention due to benign prostatic hyperplasia  Assessment & Plan  Patient presenting with acute urinary retention, most likely secondary to BPH, status post Tovar catheter placement in the ER on 10/06/2019  · After traumatic removal of Tovar by patient, he developed hematuria, required CBI, which has now been taken off  · Urine clear and yellow, medically stable for discharge today  · Hemoglobin stable   · Urology on board, to follow up as outpatient  ·  discharge on Flomax and finasteride    Closed displaced fracture of right pubis with routine healing  Assessment & Plan  Incidental finding of a closed nondisplaced fracture of the right pubic rami on CT scan pelvis, unable to determine etiology of fracture  Patient is a poor historian and does not recollect any recent falls, although he does have scattered mild bruises on his legs  Plan  - discharge to rehab today  Weight bearing as tolerated   - Pt/OT  - DVT prophylaxis  - Pain control  - Ortho on board      Gross hematuria  Assessment & Plan  Gross hematuria, started after patient self removed his Tovar catheter overnight on 10/08/2019, Tovar catheter replaced but patient still with gross bloody urine    · Urine clear  · Monitor hemoglobin  · Continue with DVT prophylaxis with Lovenox for now given recent pelvic fracture and risk of pulmonary embolism    Navarro catheter in place  Assessment & Plan  Patient presented with urinary retention, and Navarro was placed 10/06/2019 in the ER, self-removal of navarro 10/08/19, and reinsertion same day  · Gross hematuria cleared with CBI  · Patient is off CBI and his urine is clear and yellow   · Transfuse if hemoglobin falls less than 7    Anemia  Assessment & Plan  Normocytic normochromic anemia, Hb 11 8, baseline 11 2 -12 7  · Iron levels low, ferritin, folate and B12 levels within normal limts, suggests anemia of chronic kidney disease  · No intervention currently, might EPO eventually  · Hemoglobin remains stable despite gross heamturia  · Keep Hb > 7    Chronic kidney disease (CKD), active medical management without dialysis, stage 3 (moderate) ([facility] )  Assessment & Plan  Patient with history of chronic kidney disease, on admission, creatinine was elevated to 1 61, but GFR stayed the same  · Baseline creatinine 1 59   · Creatinine currently stable at 1 44  · Hydrate and trend BMP daily    Type 2 diabetes mellitus with stage 3 chronic kidney disease, without long-term current use of insulin ([facility] )  Assessment & Plan    Lab Results   Component Value Date    HGBA1C 6 7 (H) 10/07/2019       · History of diabetes on metformin, glipizide, repaglinide and Canaglifozin  · A1c 6 7  · Discontinue insulin and discharged patient on his home medications  · To follow up for regular A1c checks    Hypertension  Assessment & Plan  History of hypertension on Lasix and quinapril at home         Plan  · Quinapril switched to lisinopril  · Can be discharged today on home meds    Edema  Assessment & Plan  Chronic bilateral lower extremity pitting pedal edema, 3+ right greater than 2+ left, might be secondary to lymphatic obstruction  · Resolving, left leg no edema, right leg no edema  · Keep lower extremities elevated  · , continue with home dose lasix          Discharging Physician / Practitioner: Josh Blum MD  PCP: Ronald Gambino MD  Admission Date:   Admission Orders (From admission, onward)     Ordered        10/06/19 1802  Inpatient Admission (expected length of stay for this patient Order details is greater than two midnights)  Once                   Discharge Date: 10/11/19    Resolved Problems  Date Reviewed: 10/6/2019    None          Consultations During Hospital Stay:  · Urology  · Orthopedics    Procedures Performed:   · None    Significant Findings / Test Results:   · CT abdomen pelvis    FINDINGS:    ABDOMEN    LOWER CHEST:  No clinically significant abnormality identified in the visualized lower chest     LIVER/BILIARY TREE:  Unremarkable  GALLBLADDER:  There are gallstone(s) within the gallbladder, without pericholecystic inflammatory changes  SPLEEN:  Unremarkable  PANCREAS:  Unremarkable  ADRENAL GLANDS:  Unremarkable  KIDNEYS/URETERS:  Unremarkable  No hydronephrosis  STOMACH AND BOWEL:  Unremarkable  APPENDIX:  A normal appendix was visualized  ABDOMINOPELVIC CAVITY:  No ascites or free intraperitoneal air  No lymphadenopathy  VESSELS:  Atherosclerotic changes are present   No evidence of aneurysm  PELVIS    REPRODUCTIVE ORGANS:  The prostate is enlarged  URINARY BLADDER:  Moderately distended  ABDOMINAL WALL/INGUINAL REGIONS:  Unremarkable  OSSEOUS STRUCTURES:  Minimally displaced right inferior pubic ramus fracture without evidence of healing, but also no definite adjacent hematoma   Multilevel degenerative changes in the lumbar spine without evidence of substantial osseous spinal canal   stenosis   No destructive osseous lesion  Impression:       1   Minimally displaced right inferior pubic ramus fracture without evidence of healing, suggesting acuity  2   Cholelithiasis without evidence of cholecystitis      3   Enlarged prostate gland with moderate distention of the urinary bladder  Incidental Findings:   · None     Test Results Pending at Discharge (will require follow up): · None     Outpatient Tests Requested:  · CBC within 3 days  · BMP within 3 days    Complications:  None    Reason for Admission:  Ambulatory dysfunction    Hospital Course:     Melany Hurley is a 80 y o  male patient who originally presented to the hospital on 10/6/2019 due to ambulatory dysfunction and was found to have of right pubic ramus fracture  Hemoglobin 11 8, creatinine 1 61 on admission  CT scan showed a right inferior pubic ramus fracture, cholelithiasis and enlarged prostate  Patient was found to be retaining and had Tovar catheter placed in the ER  Two days later patient self removed his Tovar and developed gross hematuria, requiring CBI  Continuous bladder irrigation was eventually weaned off and his urine cleared up      Please see above list of diagnoses and related plan for additional information  Condition at Discharge: stable     Discharge Day Visit / Exam:     Subjective:  No overnight events  Patient feels fine this morning  Vitals: Blood Pressure: 101/50 (10/11/19 0705)  Pulse: 83 (10/11/19 0705)  Temperature: 98 6 °F (37 °C) (10/11/19 0705)  Temp Source: Oral (10/11/19 0705)  Respirations: 16 (10/11/19 0705)  Height: 5' 6" (167 6 cm) (10/06/19 1900)  Weight - Scale: 53 6 kg (118 lb 2 7 oz) (10/06/19 1900)  SpO2: 94 % (10/11/19 0705)  Exam:   Physical Exam   Constitutional: He is oriented to person, place, and time  Cardiovascular: Normal rate  Murmur heard  Pulmonary/Chest: Effort normal and breath sounds normal  No stridor  No respiratory distress  He has no wheezes  Abdominal: Soft  Bowel sounds are normal  He exhibits no distension  There is no tenderness  There is no guarding  Genitourinary:   Genitourinary Comments: Tovar draining clear yellow urine   Neurological: He is alert and oriented to person, place, and time     Vitals reviewed     )  Discussion with Family:  Patient is a  who lives alone  I have updated his friend/caretaker who comes around often    Discharge instructions/Information to patient and family:   See after visit summary for information provided to patient and family  Provisions for Follow-Up Care:  See after visit summary for information related to follow-up care and any pertinent home health orders  Disposition:     Acute Rehab at [address] [place] [address] SNF:   · [facility] ([address]) - Not Applicable to this Patient    Planned Readmission: no     Discharge Statement:  I spent 35 minutes discharging the patient  This time was spent on the day of discharge  I had direct contact with the patient on the day of discharge  Greater than 50% of the total time was spent examining patient, answering all patient questions, arranging and discussing plan of care with patient as well as directly providing post-discharge instructions  Additional time then spent on discharge activities  Discharge Medications:  See after visit summary for reconciled discharge medications provided to patient and family        ** Please Note: This note has been constructed using a voice recognition system **

## 2019-10-11 NOTE — SOCIAL WORK
Continuing to follow patient  As per Dr Claudio Armstrong  Patient is for discharge today to go to [facility] at [address] in [place]  Met with patient and he is agreeable to continuing rehab there  Arranged  van [address] to transport at 12:15   Notified patient, Sourav Carranza U  [address] , left MOM for patient's friend and Paz SALGADO Enter at [phone] and Sindhu Peralta, patient's nurse of transport time

## 2019-10-11 NOTE — ASSESSMENT & PLAN NOTE
Chronic bilateral lower extremity pitting pedal edema, 3+ right greater than 2+ left, might be secondary to lymphatic obstruction  · Resolving, left leg no edema, right leg no edema  · Keep lower extremities elevated  · , continue with home dose lasix

## 2019-10-11 NOTE — ASSESSMENT & PLAN NOTE
Patient presenting with acute urinary retention, most likely secondary to BPH, status post Tovar catheter placement in the ER on 10/06/2019  · After traumatic removal of Tovar by patient, he developed hematuria, required CBI, which has now been taken off      · Urine clear and yellow, medically stable for discharge today  · Hemoglobin stable   · Urology on board, to follow up as outpatient  ·  discharge on Flomax and finasteride

## 2019-10-11 NOTE — ASSESSMENT & PLAN NOTE
Incidental finding of a closed nondisplaced fracture of the right pubic rami on CT scan pelvis, unable to determine etiology of fracture  Patient is a poor historian and does not recollect any recent falls, although he does have scattered mild bruises on his legs        Plan  - discharge to rehab today  Weight bearing as tolerated   - Pt/OT  - DVT prophylaxis  - Pain control  - Ortho on board

## 2019-10-11 NOTE — PHYSICAL THERAPY NOTE
PT tx     10/10/19 1040   Pain Assessment   Pain Assessment No/denies pain   Pain Score No Pain   Restrictions/Precautions   RLE Weight Bearing Per Order WBAT   Other Precautions Fall Risk; Chair Alarm; Bed Alarm   General   Chart Reviewed Yes   Additional Pertinent History melanie continues   Cognition   Overall Cognitive Status WFL   Arousal/Participation Alert   Orientation Level Oriented to person;Oriented to place   Memory Within functional limits   Following Commands Follows one step commands with increased time or repetition   Bed Mobility   Supine to Sit 4  Minimal assistance   Additional items Assist x 2   Transfers   Sit to Stand 4  Minimal assistance   Additional items Assist x 1   Stand to Sit 4  Minimal assistance   Additional items Assist x 1   Stand pivot 4  Minimal assistance   Additional items Assist x 2;Verbal cues;Armrests; Increased time required  (and for navarro management)   Ambulation/Elevation   Gait pattern Short stride; Step to; Inconsistent nithin; Improper Weight shift;Decreased R stance; Forward Flexion   Gait Assistance 4  Minimal assist   Additional items Assist x 2   Assistive Device Rolling walker   Distance 8',10'   Balance   Static Sitting Good   Dynamic Sitting Fair   Static Standing Fair -   Dynamic Standing Fair -   Ambulatory Fair -   Endurance Deficit   Endurance Deficit No   Activity Tolerance   Activity Tolerance Patient tolerated treatment well   Nurse Made Aware yes   Exercises   Knee AROM Long Arc Quad Sitting;10 reps;AROM; Right;Left   Assessment   Prognosis Good   Problem List Decreased endurance; Impaired balance;Decreased mobility; Decreased safety awareness;Orthopedic restrictions   Assessment PT able to mobilize oob to chair and short distance amb alexandru rw and 2 asisst  Continues with melanie  Hope to go to rehab soon  Goals   Patient Goals get better   Plan   Treatment/Interventions ADL retraining;Functional transfer training;LE strengthening/ROM; Therapeutic exercise; Endurance training;Patient/family training;Equipment eval/education; Bed mobility;Gait training;Spoke to nursing;Spoke to case management;OT   Progress Progressing toward goals   PT Frequency 5x/wk   Recommendation   Recommendation Short-term skilled PT   Equipment Recommended Walker   PT - OK to Discharge Yes   Additional Comments   (to STR with medical clearance)   Evelyn Lawton, PT

## 2019-10-11 NOTE — ASSESSMENT & PLAN NOTE
Patient presenting with ambulatory dysfunction, found to have a right pelvic fracture      Plan  · Weight bearing as tolerated  · Patient is medically stable for discharge to rehab today   · Now off CBI, urine clear and no hematuria   · Hemoglobin stable  ·  Urology on board, to follow up outpatient  ·  Pain control with tylenol and lidocaine patch  · PT/OT on board  ·  on board  · Appreciate Ortho recommendations

## 2019-10-15 NOTE — TELEPHONE ENCOUNTER
OK for [facility] to perform void trial, however, patient should be scheduled for office visit to discuss

## 2019-10-15 NOTE — TELEPHONE ENCOUNTER
Patient recently hospitalized for right pelvic fracture  Navarro catheter placed for urinary retention  Of note patient did have traumatic removal of catheter at one point during the hospital stay with hematuria  He was discharged with navarro and on flomax and finasteride  Patient has not been seen in the office previously  Called and spoke with Tanya Barrera at [facility]  She reports they can do a full void trial at the facility if they have orders/parameters  Will review with provider for orders

## 2019-10-15 NOTE — TELEPHONE ENCOUNTER
Lay Beard from [facility] calling to set up follow up appointment for patient    Please call [phone] ext [phone]

## 2019-10-25 ENCOUNTER — OFFICE VISIT (OUTPATIENT)
Dept: UROLOGY | Facility: AMBULATORY SURGERY CENTER | Age: 84
End: 2019-10-25
Payer: MEDICARE

## 2019-10-25 VITALS
WEIGHT: 118 LBS | DIASTOLIC BLOOD PRESSURE: 50 MMHG | HEIGHT: 66 IN | HEART RATE: 85 BPM | SYSTOLIC BLOOD PRESSURE: 110 MMHG | BODY MASS INDEX: 18.96 KG/M2

## 2019-10-25 DIAGNOSIS — N40.1 URINARY RETENTION DUE TO BENIGN PROSTATIC HYPERPLASIA: Primary | ICD-10-CM

## 2019-10-25 DIAGNOSIS — R33.8 URINARY RETENTION DUE TO BENIGN PROSTATIC HYPERPLASIA: Primary | ICD-10-CM

## 2019-10-25 LAB — POST-VOID RESIDUAL VOLUME, ML POC: 203 ML

## 2019-10-25 PROCEDURE — 51798 US URINE CAPACITY MEASURE: CPT | Performed by: NURSE PRACTITIONER

## 2019-10-25 PROCEDURE — 99204 OFFICE O/P NEW MOD 45 MIN: CPT | Performed by: NURSE PRACTITIONER

## 2019-10-25 NOTE — PROGRESS NOTES
10/25/2019    Salvatore Lloyd  9/14/1927  [id number]      Assessment  -BPH with urinary retention     Discussion/Plan  Jacinda Amaral is a 80 y o  male who presents in consultation       - mL  Patient asymptomatic, and no evidence of suprapubic discomfort or distension  Per nursing home staff, patient voided at least twice since navarro catheter was removed this morning  I discussed with brother at today's visit, that cause of acute urinary retention likely secondary to longstanding BPH, constipation, and immobilization with pelvic fracture  At this time, he wishes to manage his urinary symptoms conservatively, given his advanced age  I find this reasonable, as patient was able to void since navarro catheter was removed  We did discuss long term navarro catheter or SPT insertion should he develop recurrent UTI, kidney compromise, or inability to void  However, we will monitor at this time  Patient will continue to take Flomax and finasteride daily  Reviewed dietary and behavioral modifications  Instructions provided to nursing home staff  He will follow up PRN   -All questions answered, patient agrees with plan      History of Present Illness  80 y o  male who presents in consultation for acute urinary retention  Our service was consulted on 10/7/19  At that time navarro catheter was placed  Cause likely secondary to pelvic fracture and BPH  Patient traumatically pulled navarro catheter out during hospitalization  This was reinserted, and he was discharged back to [facility] on 10/11/19  Nursing staff at [facility] removed navarro catheter this morning  They state that patient voided twice since navarro was removed  He continues to take Flomax and finasteride daily  Patient is accompanied by brother who is POA and primary historian  He denies any prior urologic history or surgical intervention  Patient states he was voiding well prior to hospitalization  He denies any gross hematuria or dysuria  Review of Systems  Review of Systems   Constitutional: Negative  HENT: Negative  Respiratory: Negative  Cardiovascular: Negative  Gastrointestinal: Negative  Genitourinary: Negative for decreased urine volume, difficulty urinating, dysuria, flank pain, frequency, hematuria and urgency  Musculoskeletal: Negative  Skin: Negative  Neurological: Negative  Psychiatric/Behavioral: Negative  Past Medical History  Past Medical History:   Diagnosis Date    Amblyopia     Diabetes mellitus [facility] )     Elevated PSA     Hard of hearing     Hyperlipidemia     Hypertension        Past Social History  Past Surgical History:   Procedure Laterality Date    HERNIA REPAIR         Past Family History  No family history on file      Past Social history  Social History     Socioeconomic History    Marital status: Single     Spouse name: Not on file    Number of children: Not on file    Years of education: Not on file    Highest education level: Not on file   Occupational History    Not on file   Social Needs    Financial resource strain: Not on file    Food insecurity:     Worry: Not on file     Inability: Not on file    Transportation needs:     Medical: Not on file     Non-medical: Not on file   Tobacco Use    Smoking status: Never Smoker    Smokeless tobacco: Never Used   Substance and Sexual Activity    Alcohol use: Yes     Comment: manhattens for "big occasions"     Drug use: No    Sexual activity: Never   Lifestyle    Physical activity:     Days per week: Not on file     Minutes per session: Not on file    Stress: Not on file   Relationships    Social connections:     Talks on phone: Not on file     Gets together: Not on file     Attends Faith service: Not on file     Active member of club or organization: Not on file     Attends meetings of clubs or organizations: Not on file     Relationship status: Not on file    Intimate partner violence:     Fear of current or ex partner: Not on file     Emotionally abused: Not on file     Physically abused: Not on file     Forced sexual activity: Not on file   Other Topics Concern    Not on file   Social History Narrative    Not on file       Current Medications  Current Outpatient Medications   Medication Sig Dispense Refill    acetaminophen (TYLENOL) 325 mg tablet Take 2 tablets (650 mg total) by mouth every 6 (six) hours 30 tablet 0    aspirin 81 mg chewable tablet Chew 81 mg daily      atorvastatin (LIPITOR) 10 mg tablet Take 10 mg by mouth daily      canagliflozin (INVOKANA) 100 mg Take 300 mg by mouth daily before breakfast        Cholecalciferol (VITAMIN D) 2000 UNITS CAPS Take 2,000 Units by mouth daily with breakfast      finasteride (PROSCAR) 5 mg tablet Take 5 mg by mouth daily      furosemide (LASIX) 20 mg tablet Take 1 tablet by mouth daily for 30 days (Patient taking differently: Take 20 mg by mouth every other day  ) 30 tablet 0    glipiZIDE (GLUCOTROL) 10 mg tablet Take 10 mg by mouth 2 (two) times a day before meals      metFORMIN (GLUCOPHAGE) 500 mg tablet Take 1,000 mg by mouth 2 (two) times a day with meals        metFORMIN (GLUCOPHAGE) 500 mg tablet Take 500 mg by mouth daily with lunch      quinapril (ACCUPRIL) 20 mg tablet Take 20 mg by mouth 2 (two) times a day      repaglinide (PRANDIN) 2 mg tablet Take 2 mg by mouth 3 (three) times a day before meals        tamsulosin (FLOMAX) 0 4 mg Take 0 4 mg by mouth 2 (two) times a day         No current facility-administered medications for this visit  Allergies  No Known Allergies    Past Medical History, Social History, Family History, medications and allergies were reviewed  Vitals  There were no vitals filed for this visit  Physical Exam  Physical Exam   Constitutional: He appears well-developed and well-nourished  HENT:   Head: Normocephalic  Eyes: Pupils are equal, round, and reactive to light  Neck: Normal range of motion  Cardiovascular: Normal rate and regular rhythm  Pulmonary/Chest: Effort normal    Abdominal: Soft  Normal appearance  He exhibits no distension  There is no tenderness  There is no CVA tenderness  Genitourinary:   Genitourinary Comments: No suprapubic discomfort or distension    Musculoskeletal: Normal range of motion  Wheelchair bound   Neurological: He is alert  Skin: Skin is warm and dry  Psychiatric: He has a normal mood and affect  His behavior is normal  Judgment and thought content normal        Results    I have personally reviewed all pertinent lab results and reviewed with patient  Lab Results   Component Value Date    PSA 3 2 10/09/2018    PSA 3 0 01/25/2018     Lab Results   Component Value Date    CALCIUM 9 8 10/09/2019    K 4 2 10/09/2019    CO2 23 10/09/2019     10/09/2019    BUN 40 (H) 10/09/2019    CREATININE 1 44 (H) 10/09/2019     Lab Results   Component Value Date    WBC 8 50 10/09/2019    HGB 11 8 (L) 10/09/2019    HCT 37 0 10/09/2019    MCV 97 10/09/2019     10/09/2019     No results found for this or any previous visit (from the past 1 hour(s))

## 2019-11-19 ENCOUNTER — APPOINTMENT (OUTPATIENT)
Dept: RADIOLOGY | Facility: CLINIC | Age: 84
End: 2019-11-19
Payer: MEDICARE

## 2019-11-19 VITALS — SYSTOLIC BLOOD PRESSURE: 116 MMHG | HEART RATE: 72 BPM | DIASTOLIC BLOOD PRESSURE: 64 MMHG

## 2019-11-19 DIAGNOSIS — S32.591A CLOSED FRACTURE OF SINGLE PUBIC RAMUS OF PELVIS, RIGHT, INITIAL ENCOUNTER (HCC): ICD-10-CM

## 2019-11-19 DIAGNOSIS — S32.591A CLOSED FRACTURE OF SINGLE RAMUS OF RIGHT PUBIS, INITIAL ENCOUNTER (HCC): Primary | ICD-10-CM

## 2019-11-19 PROBLEM — S32.501A CLOSED NONDISPLACED FRACTURE OF RIGHT PUBIS (HCC): Status: ACTIVE | Noted: 2019-10-06

## 2019-11-19 PROCEDURE — 72170 X-RAY EXAM OF PELVIS: CPT

## 2019-11-19 PROCEDURE — 99024 POSTOP FOLLOW-UP VISIT: CPT | Performed by: ORTHOPAEDIC SURGERY

## 2019-11-19 NOTE — PROGRESS NOTES
Assessment:     1  Closed fracture of single ramus of right pubis, initial encounter ([facility] )        Plan:     Problem List Items Addressed This Visit        Musculoskeletal and Integument    Closed fracture of single ramus of right pubis (HCC) - Primary    Relevant Orders    XR pelvis ap only 1 or 2 vw          Right pubic rami fracture  · Continue activities as tolerated  · Follow up as needed    Subjective:     Patient ID: Keily Obrien is a 80 y o  male  Chief Complaint:  Danae Britton is a 80year old male who presents to the office for evaluation of right pubic rami fracture found on 10/06/2019 by the ED  Patient is a poor historian and does not recall any injury  He reports doing well  He has been ambulating at his assisted living facility  He also notes right elbow pain today in the office  Allergy:  No Known Allergies  Medications:  all current active meds have been reviewed  Past Medical History:  Past Medical History:   Diagnosis Date    Amblyopia     [facility]     Elevated PSA     Hard of hearing     Hyperlipidemia     Hypertension      Past Surgical History:  Past Surgical History:   Procedure Laterality Date    HERNIA REPAIR       Family History:  History reviewed  No pertinent family history  Social History:  Social History     Substance and Sexual Activity   Alcohol Use Yes    Comment: manhattens for "big occasions"      Social History     Substance and Sexual Activity   Drug Use No     Social History     Tobacco Use   Smoking Status Never Smoker   Smokeless Tobacco Never Used     Review of Systems   Constitutional: Negative for fever and unexpected weight change  HENT: Negative for hearing loss, nosebleeds and sore throat  Eyes: Negative for pain, redness and visual disturbance  Respiratory: Negative for cough, shortness of breath and wheezing  Cardiovascular: Negative for chest pain, palpitations and leg swelling     Gastrointestinal: Negative for abdominal pain, nausea and vomiting  Endocrine: Negative for polydipsia and polyuria  Genitourinary: Negative for dysuria and hematuria  Musculoskeletal: Negative for arthralgias  Skin: Negative for rash and wound  Neurological: Negative for dizziness and headaches  Psychiatric/Behavioral: Negative for agitation and suicidal ideas  Objective:  BP Readings from Last 1 Encounters:   11/19/19 116/64      Wt Readings from Last 1 Encounters:   10/25/19 53 5 kg (118 lb)      BMI:   Estimated body mass index is 19 05 kg/m² as calculated from the following:    Height as of 10/25/19: 5' 6" (1 676 m)  Weight as of 10/25/19: 53 5 kg (118 lb)  BSA:   Estimated body surface area is 1 6 meters squared as calculated from the following:    Height as of 10/25/19: 5' 6" (1 676 m)  Weight as of 10/25/19: 53 5 kg (118 lb)  Physical Exam   Constitutional: He is oriented to person, place, and time  He appears well-developed and well-nourished  HENT:   Head: Normocephalic and atraumatic  Eyes: Conjunctivae and EOM are normal    Neck: Neck supple  Cardiovascular: Intact distal pulses  Pulmonary/Chest: Effort normal    Neurological: He is alert and oriented to person, place, and time  Skin: Skin is warm and dry  Psychiatric: He has a normal mood and affect  His behavior is normal      Right Hip Exam     Tenderness   The patient is experiencing no tenderness  Range of Motion   The patient has normal right hip ROM  Muscle Strength   The patient has normal right hip strength  Tests   CARMEN: negative    Other   Erythema: absent  Sensation: normal  Pulse: present    Comments:  Negative log roll      Left Hip Exam   Left hip exam is normal     Other   Sensation: normal  Pulse: present      Right Elbow Exam   Right elbow exam is normal             I have personally reviewed pertinent films in PACS and my interpretation is Pelvic ex x-ray show right inferior rami fracture in good alignment and healing      Scribe Attestation    I,:   Bartolo Chavez am acting as a scribe while in the presence of the attending physician :        I,:   Lamont Calix MD personally performed the services described in this documentation    as scribed in my presence :

## 2022-06-20 NOTE — CONSULTS
Consultation - Orthopedics   Jada Gaines 80 y o  male MRN: [id number]  Unit/Bed#: [address] [place] [address] [id number] Encounter: [id number]      Assessment/Plan     Assessment:  Right inferior pubic rami fracture    Plan:  Recommend non operative management at this time  Weight-bearing as tolerated to the right lower extremity with assistance  PT/OT  Pain control  DVT prophylaxis: Heparin and SCD  Follow-up as outpatient in 6 weeks  History of Present Illness   Physician Requesting Consult: Ivania Astudillo MD  Reason for Consult / Principal Problem:  Ambulatory dysfunction  HPI: Jada Gaines is a 80y o  year old male who presents with gradually worsening ambulatory dysfunction and unable to urinate for the past several days  Patient is a poor historian and does not recall any recent fall  His friend noticed that he was having more difficulty walking and brought him to the emergency room yesterday  CT scan showed a right inferior pubic rami fracture  He was admitted to the medical service and Orthopedics was consulted  Patient states he uses a walker and a cane at home as needed  He denies any prior injury to his right hip or pelvis      Consults    Review of Systems   Unable to perform ROS: Dementia       Historical Information   Past Medical History:   Diagnosis Date    Amblyopia     [facility] )     Elevated PSA     Hard of hearing     Hyperlipidemia     Hypertension      Past Surgical History:   Procedure Laterality Date    HERNIA REPAIR       Social History   Social History     Substance and Sexual Activity   Alcohol Use Yes    Comment: manhattens for "big occasions"      Social History     Substance and Sexual Activity   Drug Use No     Social History     Tobacco Use   Smoking Status Never Smoker   Smokeless Tobacco Never Used     Family History: non-contributory    Meds/Allergies   all current active meds have been reviewed  No Known Allergies    Objective   Vitals: Blood pressure 128/59, pulse 60, temperature 98 °F (36 7 °C), temperature source Oral, resp  rate 16, height 5' 6" (1 676 m), weight 53 6 kg (118 lb 2 7 oz), SpO2 97 %  ,Body mass index is 19 07 kg/m²  Intake/Output Summary (Last 24 hours) at 10/7/2019 0954  Last data filed at 10/7/2019 0501  Gross per 24 hour   Intake    Output 1100 ml   Net -1100 ml     I/O last 24 hours: In: -   Out: 1100 [Urine:1100]    Invasive Devices     Peripheral Intravenous Line            Peripheral IV 01/14/19 Right Antecubital 266 days    Peripheral IV 01/14/19 Right Antecubital 265 days    Peripheral IV 10/06/19 Left Antecubital less than 1 day          Drain            Urethral Catheter Double-lumen 16 Fr  less than 1 day                Physical Exam   Constitutional: He appears well-developed and well-nourished  No distress  HENT:   Head: Normocephalic and atraumatic  Mouth/Throat: Oropharynx is clear and moist    Eyes: Pupils are equal, round, and reactive to light  Conjunctivae and EOM are normal    Neck: Neck supple  Cardiovascular: Normal rate, regular rhythm and intact distal pulses  Pulmonary/Chest: Effort normal and breath sounds normal  No respiratory distress  Abdominal: Soft  Bowel sounds are normal  There is no tenderness  Musculoskeletal: He exhibits tenderness  Neurological: He is alert  Oriented to person   Skin: Skin is warm and dry  Right Hip Exam     Tenderness   Right hip tenderness location: Right pelvis  Range of Motion   The patient has normal right hip ROM  Other   Erythema: absent  Scars: absent  Sensation: normal  Pulse: present    Comments:  No pain with passive or active range of motion of hip  Thigh and calf compartments soft, nontender  Able to actively move hip, knee, ankle and foot            Lab Results: I have personally reviewed pertinent lab results    Imaging Studies: I have personally reviewed pertinent films in PACS  X-ray pelvis:  Nondisplaced right inferior pubic rami fracture 109

## 2023-11-10 NOTE — ASSESSMENT & PLAN NOTE
Patient presenting with acute urinary retention, most likely secondary to BPH, status post Tovar catheter placement in the ER  · Continue with home medications Flomax and finasteride  · Per Urology, okay to discharge with Tovar No